# Patient Record
Sex: FEMALE | Race: ASIAN | Employment: FULL TIME | URBAN - METROPOLITAN AREA
[De-identification: names, ages, dates, MRNs, and addresses within clinical notes are randomized per-mention and may not be internally consistent; named-entity substitution may affect disease eponyms.]

---

## 2019-05-06 ENCOUNTER — OFFICE VISIT (OUTPATIENT)
Dept: MIDWIFE SERVICES | Facility: CLINIC | Age: 29
End: 2019-05-06
Payer: COMMERCIAL

## 2019-05-06 VITALS
SYSTOLIC BLOOD PRESSURE: 102 MMHG | HEART RATE: 64 BPM | DIASTOLIC BLOOD PRESSURE: 60 MMHG | WEIGHT: 153 LBS | BODY MASS INDEX: 28.89 KG/M2 | HEIGHT: 61 IN

## 2019-05-06 DIAGNOSIS — N76.0 BACTERIAL VAGINOSIS: ICD-10-CM

## 2019-05-06 DIAGNOSIS — N89.8 VAGINAL ODOR: Primary | ICD-10-CM

## 2019-05-06 DIAGNOSIS — B96.89 BACTERIAL VAGINOSIS: ICD-10-CM

## 2019-05-06 LAB
SPECIMEN SOURCE: ABNORMAL
WET PREP SPEC: ABNORMAL

## 2019-05-06 PROCEDURE — 87210 SMEAR WET MOUNT SALINE/INK: CPT | Performed by: ADVANCED PRACTICE MIDWIFE

## 2019-05-06 PROCEDURE — 99213 OFFICE O/P EST LOW 20 MIN: CPT | Performed by: ADVANCED PRACTICE MIDWIFE

## 2019-05-06 RX ORDER — SACCHAROMYCES BOULARDII 250 MG
250 CAPSULE ORAL 2 TIMES DAILY
Qty: 30 CAPSULE | Refills: 1 | Status: SHIPPED | OUTPATIENT
Start: 2019-05-06 | End: 2019-07-03

## 2019-05-06 RX ORDER — METRONIDAZOLE 500 MG/1
500 TABLET ORAL 2 TIMES DAILY
Qty: 14 TABLET | Refills: 0 | Status: SHIPPED | OUTPATIENT
Start: 2019-05-06 | End: 2019-07-03

## 2019-05-06 RX ORDER — METRONIDAZOLE 500 MG/1
500 TABLET ORAL 2 TIMES DAILY
Qty: 14 TABLET | Refills: 0 | Status: CANCELLED | OUTPATIENT
Start: 2019-05-06 | End: 2019-05-13

## 2019-05-06 RX ORDER — MINOCYCLINE HYDROCHLORIDE 100 MG/1
CAPSULE ORAL
Refills: 0 | COMMUNITY
Start: 2018-08-27 | End: 2019-05-06

## 2019-05-06 RX ORDER — TRETINOIN 0.5 MG/G
CREAM TOPICAL
Refills: 0 | COMMUNITY
Start: 2018-08-28 | End: 2019-05-06

## 2019-05-06 ASSESSMENT — ANXIETY QUESTIONNAIRES
6. BECOMING EASILY ANNOYED OR IRRITABLE: NOT AT ALL
GAD7 TOTAL SCORE: 0
3. WORRYING TOO MUCH ABOUT DIFFERENT THINGS: NOT AT ALL
2. NOT BEING ABLE TO STOP OR CONTROL WORRYING: NOT AT ALL
IF YOU CHECKED OFF ANY PROBLEMS ON THIS QUESTIONNAIRE, HOW DIFFICULT HAVE THESE PROBLEMS MADE IT FOR YOU TO DO YOUR WORK, TAKE CARE OF THINGS AT HOME, OR GET ALONG WITH OTHER PEOPLE: NOT DIFFICULT AT ALL
5. BEING SO RESTLESS THAT IT IS HARD TO SIT STILL: NOT AT ALL
1. FEELING NERVOUS, ANXIOUS, OR ON EDGE: NOT AT ALL
7. FEELING AFRAID AS IF SOMETHING AWFUL MIGHT HAPPEN: NOT AT ALL

## 2019-05-06 ASSESSMENT — MIFFLIN-ST. JEOR: SCORE: 1361.38

## 2019-05-06 ASSESSMENT — PATIENT HEALTH QUESTIONNAIRE - PHQ9
SUM OF ALL RESPONSES TO PHQ QUESTIONS 1-9: 0
5. POOR APPETITE OR OVEREATING: NOT AT ALL

## 2019-05-06 NOTE — PATIENT INSTRUCTIONS
Vaginitis (Vaginal Irritation/Infection)    Vaginitis is very common!  The most common vaginal infections are bacterial vaginosis or yeast. These infections are not sexually transmitted but can be incredibly uncomfortable. Seek care from your midwife if signs or symptoms arise.     Normal vaginal discharge:      Is white, clear, thick or thin (it may change depending on where you are in your cycle)    Does not have a foul odor    The amount of discharge varies    Abnormal discharge/symptoms:       Itching in and around the vagina    Redness, pain or swelling    Discharge that is foamy, greenish, curd like, or bloody    Foul smelling odor    Pain when urinating or having sex    Fever    Causes of vaginal infections:      Good bacteria from the vagina have been destroyed by bad bacteria    Reaction to something in the vagina such as a tampon or scented/perfumed soaps or bubble bath    STI's    Sensitivities to soaps/detergents/dryer sheets, lubricants, etc.    Hormonal changes    Recent use of antibiotics     Infections can also occur after you've had intercourse with a new partner or if you have had frequent intercourse         Here is a list of suggestions that may help prevent/treat vaginal infections and will help maintain a healthy vaginal environment:      1.  Boosting your immune system so you can heal faster      Make sure you are getting adequate sleep    Drink 2-3 quarts of fluids per day, Cranberries or cranberry juice (unsweetened)    Eat more nuts, grains, raw veggies, yogurt, claudia, grapefruit    Decrease intake of refined sugar, red meat and alcohol    Echinacea - 3 times a day for chronic problem or every 2 hours for acute symptoms; use as directed on bottle          2.  Changing the vaginal environment to a more acid state       Soak in a warm bath tub with one cup of vinegar or lemon juice. Do not use scented soap, bubble bath, or oils.       3.  Increasing the good healthy bacteria      At each  meal drink 1 tsp apple cider vinegar and 1 tsp honey in   cup warm water    Eat garlic daily, capsules or fresh.      Take probiotics 4-8 billion units/day      4.  Preventive measures      Wear cotton underwear, no thongs.  Do not wear tight clothes or pantyhose    Shower soon after working or change out of sweaty clothing     Do not wear underwear to bed.  The vaginal environment needs to breathe    Never douche or use vaginal , the vagina is self-cleaning!    Use white, unscented toilet paper.  Do not use baby wipes.  Wipe from front to back    Use only unscented tampons and pads, buy organic products if desired    Do not use perfumes/oils/lotions near your vagina or take bubble baths    Use only mild, unscented soaps around your vaginal area     Do not use fabric softeners/dryer sheets    Use gentle, unscented detergent, consider buying non-petroleum based detergents    Use only water based lubricants during sexual contact    Abstain from intercourse during times of infection      If your symptoms do not resolve or if you have questions please call:     [facility]   [phone]

## 2019-05-06 NOTE — PROGRESS NOTES
"SUBJECTIVE:   Cheryl is a 28 year old here for vaginal symptoms:  Vaginal discharge and foul odor                   Vaginal Symptoms     Onset: A week ago    Description:  Vaginal Discharge: creamy, was light yellow at first  Itching (Pruritis): No  Burning sensation:  No  Odor:  Yes: foul  Irritation:  No    Accompanying Signs & Symptoms:  Pain with Urination: No  Abdominal Pain:  No, just slight cramping  Fever: No   History:   Sexually active:  Yes  New Partner:  No  Possibility of Pregnancy:  Does not believe so  Contraceptive type: none, \"pull out method\".    Precipitating factors:   Recent Antibiotic Use: No    Alleviating factors:  N/A   Therapies Tried and outcome: Tried RePhresh, just made it worse  Previous Episodes of Vaginitis:  No      Other associated symptoms: none.  History of STI's:  No  STI Testing offered: NO (Recommended). Declines because she has had the same partner for 15 years.    Cheryl is due for a pap smear on 19; she declines collecting pap today because she states that her insurance will not cover it.     LMP: Patient's last menstrual period was 2019.      Patient Active Problem List   Diagnosis     Erythrocytosis     Past Medical History:   Diagnosis Date     Asthma     As a child.      Past Surgical History:   Procedure Laterality Date     C INDUCED ABORTN BY DIL/EVAC      elective      Current Outpatient Medications   Medication Sig Dispense Refill     metroNIDAZOLE (FLAGYL) 500 MG tablet Take 1 tablet (500 mg) by mouth 2 times daily for 7 days 14 tablet 0     saccharomyces boulardii (FLORASTOR) 250 MG capsule Take 1 capsule (250 mg) by mouth 2 times daily 30 capsule 1     No Known Allergies    Health maintenance updated:  yes    ROS:   12 point review of systems negative other than symptoms noted below.  Genitourinary: Vaginal Discharge    PHYSICAL EXAM:    /60   Pulse 64   Ht 1.549 m (5' 1\")   Wt 69.4 kg (153 lb)   LMP 2019   BMI 28.91 kg/m  , " Body mass index is 28.91 kg/m .  General appearance:  healthy, alert and no distress  Pelvic Exam:  Vulva: No lesions, no adenopathy, BUS:  wnl. na  Vagina: Moist, pink, discharge white, copious, thin with a few curd-like areas,  well rugated, no lesions  Cervix: smooth, pink, no visible lesions.   Rectal exam: deferred    ASSESSMENT/PLAN:     ICD-10-CM    1. Vaginal odor N89.8 Wet prep   2. Bacterial vaginosis N76.0 saccharomyces boulardii (FLORASTOR) 250 MG capsule    B96.89 metroNIDAZOLE (FLAGYL) 500 MG tablet             COUNSELING:  Advised no alcohol during treat with flagyl. Rx sent to pharmacy.  Use a probiotic when taking antibiotics; Cheryl requested an Rx, sent.   Abstain from sexual intercourse while being treated for vaginal infection  Handout provided about vaginitis and how to prevent future infections.  Return to clinic if symptoms persist or worsen  Advised Cheryl to return to clinic on or after 6/28/19 for annual well-woman exam and pap smear.    Jayna Stone, DNP, APRN, CNM    20 minutes were spent face to face with the patient today discussing her history, diagnosis, and follow-up plan as noted above. Over 50% of the visit was spent in counseling and coordination of care.     Total Visit Time: 20 minutes.

## 2019-05-07 ASSESSMENT — ANXIETY QUESTIONNAIRES: GAD7 TOTAL SCORE: 0

## 2019-07-03 ENCOUNTER — OFFICE VISIT (OUTPATIENT)
Dept: FAMILY MEDICINE | Facility: CLINIC | Age: 29
End: 2019-07-03
Payer: COMMERCIAL

## 2019-07-03 VITALS
HEART RATE: 84 BPM | OXYGEN SATURATION: 98 % | HEIGHT: 61 IN | DIASTOLIC BLOOD PRESSURE: 68 MMHG | RESPIRATION RATE: 12 BRPM | WEIGHT: 154 LBS | TEMPERATURE: 99.2 F | SYSTOLIC BLOOD PRESSURE: 108 MMHG | BODY MASS INDEX: 29.07 KG/M2

## 2019-07-03 DIAGNOSIS — Z00.00 ROUTINE GENERAL MEDICAL EXAMINATION AT A HEALTH CARE FACILITY: Primary | ICD-10-CM

## 2019-07-03 DIAGNOSIS — Z30.011 ENCOUNTER FOR BCP (BIRTH CONTROL PILLS) INITIAL PRESCRIPTION: ICD-10-CM

## 2019-07-03 DIAGNOSIS — Z12.4 SCREENING FOR CERVICAL CANCER: ICD-10-CM

## 2019-07-03 PROCEDURE — G0145 SCR C/V CYTO,THINLAYER,RESCR: HCPCS | Performed by: INTERNAL MEDICINE

## 2019-07-03 PROCEDURE — 99385 PREV VISIT NEW AGE 18-39: CPT | Performed by: INTERNAL MEDICINE

## 2019-07-03 RX ORDER — METFORMIN HCL 500 MG
TABLET, EXTENDED RELEASE 24 HR ORAL
COMMUNITY
Start: 2019-06-25 | End: 2020-05-05

## 2019-07-03 RX ORDER — DROSPIRENONE AND ETHINYL ESTRADIOL 0.02-3(28)
1 KIT ORAL DAILY
Qty: 84 TABLET | Refills: 4 | Status: SHIPPED | OUTPATIENT
Start: 2019-07-03 | End: 2020-05-05

## 2019-07-03 ASSESSMENT — MIFFLIN-ST. JEOR: SCORE: 1360.92

## 2019-07-03 NOTE — PROGRESS NOTES
SUBJECTIVE:   CC: Cheryl Fletcher is an 29 year old woman who presents for preventive health visit.     Alek lives with her 3 kids (ages 3, 4, and 5), , and father. She works as an .     Healthy Habits:     Getting at least 3 servings of Calcium per day:  Yes    Bi-annual eye exam:  Yes    Dental care twice a year:  Yes    Sleep apnea or symptoms of sleep apnea:  None    Diet:  Regular (no restrictions)    Frequency of exercise:  4-5 days/week    Duration of exercise:  15-30 minutes    Taking medications regularly:  Yes    Medication side effects:  Not applicable    PHQ-2 Total Score: 0    Additional concerns today:  No      Sees a provider at her work clinic for metformin which she takes for weight loss.       Today's PHQ-2 Score:   PHQ-2 (  [employer]) 7/3/2019   Q1: Little interest or pleasure in doing things 0   Q2: Feeling down, depressed or hopeless 0   PHQ-2 Score 0   Q1: Little interest or pleasure in doing things Not at all   Q2: Feeling down, depressed or hopeless Not at all   PHQ-2 Score 0           Social History     Tobacco Use     Smoking status: Never Smoker     Smokeless tobacco: Never Used   Substance Use Topics     Alcohol use: No     Alcohol/week: 0.0 oz         Alcohol Use 7/3/2019   Prescreen: >3 drinks/day or >7 drinks/week? No       Reviewed orders with patient.  Reviewed health maintenance and updated orders accordingly - Yes  Patient Active Problem List   Diagnosis     Erythrocytosis     Past Surgical History:   Procedure Laterality Date     C INDUCED ABORTN BY DIL/EVAC      elective        Social History     Tobacco Use     Smoking status: Never Smoker     Smokeless tobacco: Never Used   Substance Use Topics     Alcohol use: No     Alcohol/week: 0.0 oz     Family History   Problem Relation Age of Onset     Rectal Cancer Mother      Hypertension Sister      Family History Negative No family hx of              Mammogram not appropriate for this patient based on  "age.    Pertinent mammograms are reviewed under the imaging tab.  History of abnormal Pap smear: NO - age 21-29 PAP every 3 years recommended  PAP / HPV 6/28/2016 6/6/2013   PAP NIL NIL     Reviewed and updated as needed this visit by clinical staff  Tobacco  Allergies  Meds  Med Hx  Surg Hx  Fam Hx  Soc Hx        Reviewed and updated as needed this visit by Provider  Tobacco  Meds  Med Hx  Surg Hx  Fam Hx  Soc Hx           Review of Systems  CONSTITUTIONAL: NEGATIVE for fever, chills, change in weight  INTEGUMENTARU/SKIN: NEGATIVE for worrisome rashes, moles or lesions  EYES: NEGATIVE for vision changes or irritation  ENT: NEGATIVE for ear, mouth and throat problems  RESP: NEGATIVE for significant cough or SOB  BREAST: NEGATIVE for masses, tenderness or discharge  CV: NEGATIVE for chest pain, palpitations or peripheral edema  GI: NEGATIVE for nausea, abdominal pain, heartburn, or change in bowel habits  : NEGATIVE for unusual urinary or vaginal symptoms. Periods are regular about every 45 days.  MUSCULOSKELETAL: NEGATIVE for significant arthralgias or myalgia  NEURO: NEGATIVE for weakness, dizziness or paresthesias  PSYCHIATRIC: NEGATIVE for changes in mood or affect     OBJECTIVE:   /68 (Cuff Size: Adult Regular)   Pulse 84   Temp 99.2  F (37.3  C) (Tympanic)   Resp 12   Ht 1.549 m (5' 1\")   Wt 69.9 kg (154 lb)   LMP 06/05/2019 (Approximate)   SpO2 98%   BMI 29.10 kg/m    Physical Exam  GENERAL: healthy, alert and no distress  EYES: Eyes grossly normal to inspection, PERRL and conjunctivae and sclerae normal  HENT: ear canals and TM's normal, mouth without ulcers or lesions  NECK: no adenopathy, no asymmetry, masses, or scars and thyroid normal to palpation  RESP: lungs clear to auscultation - no rales, rhonchi or wheezes  BREAST: normal without masses, tenderness or nipple discharge and no palpable axillary masses or adenopathy  CV: regular rate and rhythm, normal S1 S2, no S3 or S4, no " "murmur, click or rub, no peripheral edema and peripheral pulses strong  ABDOMEN: soft, nontender, no hepatosplenomegaly, no masses and bowel sounds normal   (female): normal female external genitalia, normal urethral meatus, vaginal mucosa pink, moist, well rugated, and normal cervix  MS: no gross musculoskeletal defects noted, no edema  SKIN: no suspicious lesions or rashes  NEURO: Normal strength and tone, mentation intact and speech normal  PSYCH: mentation appears normal, affect normal/bright        ASSESSMENT/PLAN:   1. Routine general medical examination at a health care facility  Healthy 29 year old. Gets labs done at clinic at work.  Imms UTD    2. Encounter for BCP (birth control pills) initial prescription  Discussed options for birth control, she would like to go with the pill. Reviewed common side effects. She has no personal of FH of VTE.    - drospirenone-ethinyl estradiol (CHRISTINE) 3-0.02 MG tablet; Take 1 tablet by mouth daily  Dispense: 84 tablet; Refill: 4    3. Screening for cervical cancer  - PAP imaged thin layer screen reflex to HPV if ASCUS - recommended age 25 - 29 years    COUNSELING:  Reviewed preventive health counseling, as reflected in patient instructions  Special attention given to:        Regular exercise       Healthy diet/nutrition       Contraception       Osteoporosis Prevention/Bone Health    Estimated body mass index is 29.1 kg/m  as calculated from the following:    Height as of this encounter: 1.549 m (5' 1\").    Weight as of this encounter: 69.9 kg (154 lb).         reports that she has never smoked. She has never used smokeless tobacco.      Counseling Resources:  ATP IV Guidelines  Pooled Cohorts Equation Calculator  Breast Cancer Risk Calculator  FRAX Risk Assessment  ICSI Preventive Guidelines  Dietary Guidelines for Americans, 2010  [employer] MyPlate  ASA Prophylaxis  Lung CA Screening    Nallely Jones MD  [facility]  "

## 2019-07-03 NOTE — PROGRESS NOTES
Answers for HPI/ROS submitted by the patient on 7/3/2019   Annual Exam:  Frequency of exercise:: 4-5 days/week  Getting at least 3 servings of Calcium per day:: Yes  Diet:: Regular (no restrictions)  Taking medications regularly:: Yes  Medication side effects:: Not applicable  Bi-annual eye exam:: Yes  Dental care twice a year:: Yes  Sleep apnea or symptoms of sleep apnea:: None  Additional concerns today:: No  Duration of exercise:: 15-30 minutes

## 2019-07-08 LAB
COPATH REPORT: NORMAL
PAP: NORMAL

## 2020-03-02 ENCOUNTER — HEALTH MAINTENANCE LETTER (OUTPATIENT)
Age: 30
End: 2020-03-02

## 2020-05-05 ENCOUNTER — PRENATAL OFFICE VISIT (OUTPATIENT)
Dept: NURSING | Facility: CLINIC | Age: 30
End: 2020-05-05
Payer: COMMERCIAL

## 2020-05-05 VITALS — BODY MASS INDEX: 29.1 KG/M2 | HEIGHT: 61 IN

## 2020-05-05 DIAGNOSIS — Z34.82 ENCOUNTER FOR SUPERVISION OF OTHER NORMAL PREGNANCY IN SECOND TRIMESTER: Primary | ICD-10-CM

## 2020-05-05 PROCEDURE — 99207 ZZC NO CHARGE NURSE ONLY: CPT

## 2020-05-05 RX ORDER — PRENATAL VIT/IRON FUM/FOLIC AC 27MG-0.8MG
1 TABLET ORAL DAILY
Qty: 90 TABLET | Refills: 3
Start: 2020-05-05

## 2020-05-05 SDOH — ECONOMIC STABILITY: TRANSPORTATION INSECURITY
IN THE PAST 12 MONTHS, HAS LACK OF TRANSPORTATION KEPT YOU FROM MEETINGS, WORK, OR FROM GETTING THINGS NEEDED FOR DAILY LIVING?: NO

## 2020-05-05 SDOH — ECONOMIC STABILITY: TRANSPORTATION INSECURITY
IN THE PAST 12 MONTHS, HAS THE LACK OF TRANSPORTATION KEPT YOU FROM MEDICAL APPOINTMENTS OR FROM GETTING MEDICATIONS?: NO

## 2020-05-05 SDOH — ECONOMIC STABILITY: FOOD INSECURITY: WITHIN THE PAST 12 MONTHS, THE FOOD YOU BOUGHT JUST DIDN'T LAST AND YOU DIDN'T HAVE MONEY TO GET MORE.: NEVER TRUE

## 2020-05-05 SDOH — ECONOMIC STABILITY: FOOD INSECURITY: WITHIN THE PAST 12 MONTHS, YOU WORRIED THAT YOUR FOOD WOULD RUN OUT BEFORE YOU GOT MONEY TO BUY MORE.: NEVER TRUE

## 2020-05-05 SDOH — ECONOMIC STABILITY: INCOME INSECURITY: HOW HARD IS IT FOR YOU TO PAY FOR THE VERY BASICS LIKE FOOD, HOUSING, MEDICAL CARE, AND HEATING?: NOT VERY HARD

## 2020-05-05 NOTE — PROGRESS NOTES
"Chief Complaint   Patient presents with     Prenatal Care     New Prenatal Nurse Telephone Visit       21w0d      Initial Ht 1.549 m (5' 1\")   LMP 2019   BMI 29.10 kg/m   Estimated body mass index is 29.1 kg/m  as calculated from the following:    Height as of this encounter: 1.549 m (5' 1\").    Weight as of 7/3/19: 69.9 kg (154 lb).  BP completed using cuff size: NA (Not Taken)    Questioned patient about current smoking habits.  Pt. has never smoked.      Prenatal book and folder (containing standard educational hand-outs and brochures) to be given to patient at appt 20 Information in folder reviewed. Questions answered. Brochure given on optional screening available to assess chromosomal anomalies. Pt advised to call the clinic if she has any questions or concerns related to her preg/geraldine./ Prenatal labs to be obtained 20.New prenatal visit scheduled on 20 with Ame Henry CNM.        Lab Results   Component Value Date    PAP NIL 2019           Patient supplied answers from flow sheet for:  Prenatal OB Questionnaire.  Past Medical History  Diabetes?: No  Hypertension : No  Heart disease, mitral valve prolapse or rheumatic fever?: No  An autoimmune disease such as lupus or rheumatoid arthritis?: No  Kidney disease or urinary tract infection?: No  Epilepsy, seizures or spells?: No  Migraine headaches?: No  A stroke or loss of function or sensation?: No  Any other neurological problems?: No  Have you ever been treated for depression?: No  Are you having problems with crying spells or loss of self-esteem?: No  Have you ever required psychiatric care?: No  Have you ever had hepatitis, liver disease or jaundice?: No  Have you been treated for blood clots in your veins, deep vein thromosis, inflammation in the veins, thrombosis, phlebitis, pulmonary embolism or varicosities?: No  Have you had excessive bleeding after surgery or dental work?: No  Do you bleed more than other women " after a cut or scratch?: No  Do you have a history of anemia?: (!) Yes  Have you ever had thyroid problems or taken thyroid medication?: No   Do you have any endocrine problems?: No  Have you ever been in a major accident or suffered serious trauma?: No  Within the last year, has anyone hit, slapped, kicked or otherwise hurt you?: No  In the last year, has anyone forced you to have sex when you didn't want to?: No    Past Medical History 2   Have you ever received a blood transfusion?: No  Would you refuse a blood transfusion if a doctor judged it to be medically necessary?: No   If you answered Yes, would you rather die than receive a blood transfusion?: No  If you answered Yes, is this for Orthodoxy reasons?: No  Does anyone in your home smoke?: (!) Yes(FOB smokes outside the home)  Do you use tobacco products?: No  Do you drink beer, wine or hard liquor?: No  Do you use any of the following: marijuana, speed, cocaine, heroin, hallucinogens or other drugs?: No   Is your blood type Rh negative?: No  Have you ever had abnormal antibodies in your blood?: No  Have you ever had asthma?: (!) Yes(as a child)  Have you ever had tuberculosis?: No  Do you have any allergies to drugs or over-the-counter medications?: No  Allergies: Dust Mites, Aspartame, Ethanol, Venlafaxine, Hydrochloride, Sertraline: No  Have you had any breast problems?: No  Have you ever ?: (!) Yes  Have you had any gynecological surgical procedures such as cervical conization, a LEEP procedure, laser treatment, cryosurgery of the cervix or a dilation and curettage, etc?: (!) Yes(TAB)  Have you ever had any other surgical procedures?: (!) Yes(see surgical history)  Have you been hospitalized for a nonsurgical reason excluding normal delivery?: No  Have you ever had any anesthetic complications?: No  Have you ever had an abnormal pap smear?: No    Past Medical History (Continued)  Do you have a history of abnormalities of the uterus?: No  Did  your mother take KEILA or any other hormones when she was pregnant with you?: No  Did it take you more than a year to become pregnant?: No  Have you ever been evaluated or treated for infertility?: No  Is there a history of medical problems in your family, which you feel may be important to this pregnancy?: No  Do you have any other problems we have not asked about which you feel may be important to this pregnancy?: No    Symptoms since last menstrual period  Do you have any of the following symptoms: abdominal pain, blood in stools or urine, chest pain, shortness of breath, coughing or vomiting up blood, your heart racing or skipping beats, nausea and vomiting, pain on urination or vaginal discharge or bleed: No  Current medications, including over-the-counter medications, you are using? (If not applicable answer none): PNV  Will the patient be 35 years old or older at the time of delivery?: No    Has the patient, baby's father or anyone in either family had:  Thalassemia (Italian, Greek, Mediterranean or  background only) and an MCV result less than 80?: No  Neural tube defect such as meningomyelocele, spina bifida or anencephaly?: No  Congenital heart defect?: No  Down's Syndrome?: No  Vernon-Sachs disease (Buddhism, Cajun, Japanese-Ada)?: No  Sickle cell disease or trait ()?: No  Hemophilia or other inherited problems of blood?: No  Muscular dystrophy?: No  Cystic fibrosis?: No  [place]'s chorea?: No  Mental retardation/autism?: No  If yes, was the person tested for fragile X?: No  Any other inherited genetic or chromosomal disorder?: No  Maternal metabolic disorder (e.g Insulin-dependent diabetes, PKU)?: No  A child with birth defects not listed above?: No  Recurrent pregnancy loss or stillbirth?: No   Has the patient had any medications/street drugs/alcohol since her last menstrual period?: No  Does the patient or baby's father have any other genetic risks?: No    Infection History   Do you object  to being tested for Hepatitis B?: No  Do you object to being tested for HIV?: No   Do you feel that you are at high risk for coming in contact with the AIDS virus?: No  Have you ever been treated for tuberculosis?: No  Have you ever had a positive skin test for tuberculosis?: No  Do you live with someone who has tuberculosis?: No  Have you ever been exposed to tuberculosis?: No  Do you have genital herpes?: No  Does your partner have genital herpes?: No  Have you had a viral illness since your last period?: No  Have you ever had gonorrhea, chlamydia, syphilis, venereal warts, trichomoniasis, pelvic inflammatory disease or any other sexually transmitted disease?: No  Do you know if you are a genital group B streptococcus carrier?: (!) Yes  Have you had chicken pox/varicella?: No   Have you been vaccinated against chicken Pox?: (!) Yes  Have you had any other infectious diseases?: No    Raquel Vera RN

## 2020-05-08 ENCOUNTER — PRENATAL OFFICE VISIT (OUTPATIENT)
Dept: OBGYN | Facility: CLINIC | Age: 30
End: 2020-05-08
Payer: COMMERCIAL

## 2020-05-08 VITALS — SYSTOLIC BLOOD PRESSURE: 118 MMHG | WEIGHT: 161.9 LBS | DIASTOLIC BLOOD PRESSURE: 62 MMHG | BODY MASS INDEX: 30.59 KG/M2

## 2020-05-08 DIAGNOSIS — Z34.82 ENCOUNTER FOR SUPERVISION OF OTHER NORMAL PREGNANCY IN SECOND TRIMESTER: ICD-10-CM

## 2020-05-08 DIAGNOSIS — Z34.82 ENCOUNTER FOR SUPERVISION OF OTHER NORMAL PREGNANCY, SECOND TRIMESTER: Primary | ICD-10-CM

## 2020-05-08 LAB
ABO + RH BLD: NORMAL
ABO + RH BLD: NORMAL
BLD GP AB SCN SERPL QL: NORMAL
BLOOD BANK CMNT PATIENT-IMP: NORMAL
ERYTHROCYTE [DISTWIDTH] IN BLOOD BY AUTOMATED COUNT: 13.5 % (ref 10–15)
HCT VFR BLD AUTO: 31.6 % (ref 35–47)
HGB BLD-MCNC: 10.3 G/DL (ref 11.7–15.7)
MCH RBC QN AUTO: 25.1 PG (ref 26.5–33)
MCHC RBC AUTO-ENTMCNC: 32.6 G/DL (ref 31.5–36.5)
MCV RBC AUTO: 77 FL (ref 78–100)
PLATELET # BLD AUTO: 230 10E9/L (ref 150–450)
RBC # BLD AUTO: 4.1 10E12/L (ref 3.8–5.2)
SPECIMEN EXP DATE BLD: NORMAL
WBC # BLD AUTO: 9.3 10E9/L (ref 4–11)

## 2020-05-08 PROCEDURE — 86850 RBC ANTIBODY SCREEN: CPT | Performed by: ADVANCED PRACTICE MIDWIFE

## 2020-05-08 PROCEDURE — 87389 HIV-1 AG W/HIV-1&-2 AB AG IA: CPT | Performed by: ADVANCED PRACTICE MIDWIFE

## 2020-05-08 PROCEDURE — 86762 RUBELLA ANTIBODY: CPT | Performed by: ADVANCED PRACTICE MIDWIFE

## 2020-05-08 PROCEDURE — 86780 TREPONEMA PALLIDUM: CPT | Performed by: ADVANCED PRACTICE MIDWIFE

## 2020-05-08 PROCEDURE — 87491 CHLMYD TRACH DNA AMP PROBE: CPT | Performed by: ADVANCED PRACTICE MIDWIFE

## 2020-05-08 PROCEDURE — 86900 BLOOD TYPING SEROLOGIC ABO: CPT | Performed by: ADVANCED PRACTICE MIDWIFE

## 2020-05-08 PROCEDURE — 36415 COLL VENOUS BLD VENIPUNCTURE: CPT | Performed by: ADVANCED PRACTICE MIDWIFE

## 2020-05-08 PROCEDURE — 87340 HEPATITIS B SURFACE AG IA: CPT | Performed by: ADVANCED PRACTICE MIDWIFE

## 2020-05-08 PROCEDURE — 87086 URINE CULTURE/COLONY COUNT: CPT | Performed by: ADVANCED PRACTICE MIDWIFE

## 2020-05-08 PROCEDURE — 87591 N.GONORRHOEAE DNA AMP PROB: CPT | Performed by: ADVANCED PRACTICE MIDWIFE

## 2020-05-08 PROCEDURE — 99207 ZZC FIRST OB VISIT: CPT | Performed by: ADVANCED PRACTICE MIDWIFE

## 2020-05-08 PROCEDURE — 85027 COMPLETE CBC AUTOMATED: CPT | Performed by: ADVANCED PRACTICE MIDWIFE

## 2020-05-08 PROCEDURE — 86901 BLOOD TYPING SEROLOGIC RH(D): CPT | Performed by: ADVANCED PRACTICE MIDWIFE

## 2020-05-08 NOTE — NURSING NOTE
"Chief Complaint   Patient presents with     Prenatal Care     NPN 21w 3d       Initial /62 (BP Location: Right arm, Cuff Size: Adult Regular)   Wt 73.4 kg (161 lb 14.4 oz)   LMP 2019   BMI 30.59 kg/m   Estimated body mass index is 30.59 kg/m  as calculated from the following:    Height as of 20: 1.549 m (5' 1\").    Weight as of this encounter: 73.4 kg (161 lb 14.4 oz).  BP completed using cuff size: regular    Questioned patient about current smoking habits.  Pt. has never smoked.          The following HM Due: KERLINE Bell CMA      "

## 2020-05-08 NOTE — PATIENT INSTRUCTIONS
Thank you for coming to see the Midwives at the   [facility]      We will notify you about your labs that were drawn today once we get the results back or if you have [employer] they will be posted there as well      We will call you personally with results that require further discussion      If any referrals were ordered today you should be getting a call in the next week or you may need to call the number listed with your referral to schedule.            If you need any refills of medications please call your pharmacy and they will contact us      If you have any questions or concerns before your next visit, you can reach the nurse midwife on call by calling our pager number [phone].      If you  wish to schedule another appointment, please call our office at [phone]. You can also make appointments through [employer]        If you have a medical emergency please call [phone].    Because you are pregnant, we have additional resources for you:      You may call our consulting RN's during normal business hours for non-urgent questions about your pregnancy.      After hours you may also page the midwife on call for urgent questions or issues at [phone].  There is always a midwife on call 24 hours a day.    Prenatal Reminders:    Before 14 weeks: Dating ultrasound, Genetic testing       This ultrasound helps us determine your dates accurately. Verifi can be drawn anytime after 10 weeks of gestation  16 weeks: Optional genetic testing (quad screen) or single AFP       This testing helps understand your baby's risk for some genetic abnormalities.  20 weeks:  Screening ultrasound (fetal survey)       This testing will look for early growth abnormalities, and may tell the baby's sex if you wish to find out.  28 weeks: One hour sugar test (GCT), hemoglobin and platelets       This test helps identify diabetes of pregnancy or gestational diabetes.  We also look      at the iron in your blood and how well your  blood clots.  28 - 36 weeks: Tetanus shot (Tdap)       This shot helps protect you and your baby from tetanus and whooping cough.  36 weeks and later: Group B Strep test (GBS)       This test helps predict if you need antibiotics in labor to prevent infection in your baby.  Anytime September to April:  Flu shot       This shot helps protect you and your family from the flu.  This is especially important during pregnancy        Any time during or after your pregnancy you may experience increased depression and/or mood changes.    We are here to support you. Please contact us if you are:    Feeling anxious    Overwhelmed or sad     Trouble sleeping    Crying uncontrollably    Trouble caring for yourself or baby.    The typical schedule after your first visit today you can expect:     Visit 2 - 12-16 weeks  Visit 3 - 20 weeks  Visit 4 - 24 weeks  Visit 5 - 28 weeks  Visit 6 - 32 weeks  Visit 7 - 34 weeks  Visit 8 - 36 weeks  Weekly after 36 weeks until delivery.    If anything comes up between your visits or you have concerns please don't hesitate to contact us.    Secure access to your medical record:  Use [employer] (secure email communication and access to your chart) to send your primary care provider a message or make an appointment. Ask someone on your Team how to sign up for [employer]. To log on to [employer] or for more information in [employer] please visit the website at [email or web address].       Certified Nurse Midwife (CNM) Team  VINCENT Tolliver, VINCENT Harris, VINCENT Cole, VINCENT Moseley, SUKUMAR    Again, thank you for choosing the midwives at [facility].  We are excited to be a part of your pregnancy. Please let us know how we can best partner with you to improve your and your family's health.

## 2020-05-08 NOTE — PROGRESS NOTES
Cheryl Fletcher is a 29 year old single [place]ian,  who is not a previous CNM patient. She presents for a new OB Visit. This was not a planned pregnancy.   Was previously on OCPs but hadn't refilled them for several months.  FOB is Mio who is in good health.  DWAYNE IS actively involved in relationship and this pregnancy.    She first realized she was pregnant back in February. Scheduled NOB with another clinic but was told she would have to reschedule at another location, and then quarantine advisories were initiated due to Covid-19, so she was not able to establish care until now.    She has not had bleeding since her LMP.    She denies abdominal pain since her LMP.  She had very mild nausea without vomiting in the first trimester. Nausea triggered only by brushing teeth.    Any personal or family history of blood clots? No  History of sickle cell anemia or trait? No         Patient's last menstrual period was 2019. EDC 19.  Estimated Date of Delivery: Sep 15, 2020 Ultrasound consistent with LMP.    MENSTRUAL HISTORY    frequency: every 40-45 days off OCPs  Last PAP:    History of abnormal Pap?  No    Health maintenance updated:  yes        Current medications are:    Current Outpatient Medications:      Prenatal Vit-Fe Fumarate-FA (PRENATAL MULTIVITAMIN W/IRON) 27-0.8 MG tablet, Take 1 tablet by mouth daily, Disp: 90 tablet, Rfl: 3     INFECTION HISTORY  HIV: No  Hepatitis B: No  Hepatitis C: No  Tuberculosis: No    Genital Herpes self: no  Herpes partner:  no  Chlamydia:  no  Gonorrhea:  no  HPV: No  BV:  No  Syphilis:  No  Chicken Pox:  No - vaccinated      OB HISTORY  OB History    Para Term  AB Living   5 3 3 0 1 3   SAB TAB Ectopic Multiple Live Births   0 1 0 0 3      # Outcome Date GA Lbr Jose M/2nd Weight Sex Delivery Anes PTL Lv   5 Current            4 Term 16 39w4d 08:59 / 00:05 3.43 kg (7 lb 9 oz) F Vag-Spont Local N NERI      Apgar1: 9  Apgar5: 9   3 Term 02/12/15 40w2d  03:14 / 00:05 3.997 kg (8 lb 13 oz) F Vag-Spont None N NERI      Apgar1: 7  Apgar5: 9   2 Term 13 39w5d 09:34 / 01:37 3.3 kg (7 lb 4.4 oz) M Vag-Spont EPI  NERI      Name: Binu      Apgar1: 9  Apgar5: 9   1 TAB  17w0d             Birth Comments:        History of GDM: No,  PTL : No,  History of HTN in pregnancy: No,  Thrombocytopenia: No,  Shoulder dystocia: Yes - with second baby in   Vacuum Extraction: No  PPH: No   3rd of 4th degree laceration: No.   Other complications: No    PERSONAL HISTORY  Exercise Habits:  none  Employment: Full time.  Works as an  for [employer]. Her job involves sedentary activity with no potential for toxic exposure.    Travel plans:  are none planned.   Diet: eats regular meals and follows a balanced nutrition diet  Abuse concerns? No  Hgb A1c screen:  BMI > 30: No, 1st degree family DM: No, History of GDM: No, PCOS: No, High risk ethnicity: No    Social History     Socioeconomic History     Marital status: Single     Spouse name: Not on file     Number of children: 3     Years of education: Not on file     Highest education level: Associate degree: academic program   Occupational History     Occupation:    Social Needs     Financial resource strain: Not very hard     Food insecurity     Worry: Never true     Inability: Never true     Transportation needs     Medical: No     Non-medical: No   Tobacco Use     Smoking status: Never Smoker     Smokeless tobacco: Never Used   Substance and Sexual Activity     Alcohol use: No     Alcohol/week: 0.0 standard drinks     Drug use: No     Sexual activity: Yes     Partners: Male   Lifestyle     Physical activity     Days per week: Not on file     Minutes per session: Not on file     Stress: Not on file   Relationships     Social connections     Talks on phone: Not on file     Gets together: Not on file     Attends Church service: Not on file     Active member of club or organization: Not on  file     Attends meetings of clubs or organizations: Not on file     Relationship status: Not on file     Intimate partner violence     Fear of current or ex partner: Not on file     Emotionally abused: Not on file     Physically abused: Not on file     Forced sexual activity: Not on file   Other Topics Concern     Not on file   Social History Narrative     Not on file         She  reports that she has never smoked. She has never used smokeless tobacco.  STD testing offered?  Accepted  Last PHQ-9 score on record =   PHQ-9 SCORE 2019   PHQ-9 Total Score -   PHQ-9 Total Score 0     Last GAD7 score on record =   YASH-7 SCORE 2019   Total Score 0     Alcohol Score = 0  Referral/Meds needed? no    PAST MEDICAL/SURGICAL HISTORY  Past Medical History:   Diagnosis Date     Asthma     As a child.      Past Surgical History:   Procedure Laterality Date     C INDUCED ABORTN BY DIL/EVAC      elective      LASIK BILATERAL         FAMILY HISTORY  Family History   Problem Relation Age of Onset     Rectal Cancer Mother      Hypertension Sister      Asthma Father      Family History Negative No family hx of          ROS:  12 point review of systems negative other than symptoms noted below or in the HPI.    PHYSICAL EXAM  Vitals: /62 (BP Location: Right arm, Cuff Size: Adult Regular)   Wt 73.4 kg (161 lb 14.4 oz)   LMP 2019   BMI 30.59 kg/m    BMI= Body mass index is 30.59 kg/m .     GENERAL:  29 year old pleasant pregnant female, alert, cooperative and well groomed.  NECK:  Thyroid without enlargement and nodules.  Lymph nodes not palpable.   LUNGS:  Normal diaphragmatic excursion. Clear to auscultation bilaterally.  HEART:  RRR without murmur.  ABDOMEN: Soft without masses or tenderness. Fundus measures appropriately with dates and FHTs audible and WNL.  PELVIC: Deferred  LOWER EXTREMITIES: No edema. Normal reflexes.    ASSESSMENT/PLAN:    IUP at 21w3d    ICD-10-CM    1. Encounter for supervision of  other normal pregnancy, second trimester  Z34.82 NEISSERIA GONORRHOEA PCR     CHLAMYDIA TRACHOMATIS PCR   2. Encounter for supervision of other normal pregnancy in second trimester  Z34.82 Neisseria gonorrhoeae PCR     Chlamydia trachomatis PCR     Urine Culture Aerobic Bacterial     Treponema Abs w Reflex to RPR and Titer     Rubella Antibody IgG Quantitative     HIV Antigen Antibody Combo     CBC with platelets     Hepatitis B surface antigen     ABO/Rh type and screen        consult for US for AMA patients: No  Genetic Testing reviewed and discussed, patient declines testing.    Informed of bilateral CPCs on ultrasound this afternoon. Procedure report not finalized yet; anticipate recommendations for follow-up ultrasound in the coming weeks.    COUNSELING    Reviewed CNM philosophy, call schedule for labor and delivery, and FSH for delivery    1st OB handout given outlining appointment spacing and CNM information. Discussed modified face-to-face prenatal visits due to Covid-19    Instructed on use of triage nurse line and contacting the on call CNM after hours in an emergency.     Symptoms of N&V and fatigue usually start to resolve around 12-16 weeks    Reviewed exercise and nutrition    Recommend to gain 15-25 pounds with her pregnancy (pre-pregnancy BMI<30).    Discussed OTC medications. OB med list given    Encouraged patient to arrange  if needed    Encouraged patient to take PNV's/DHA    Travel precautions discussed, no air travel after 36 weeks and Zika Virus discussed    Will call patient with lab results when available    Does patient desire a RN home visit from the [facility]?  No     F/U to be addressed next visit:  Follow-up ultrasound    Will plan virtual visit in 4 weeks for her next routine prenatal check.  Will call to be seen sooner if problems arise.    Kerrie Henry, DNP, APRN, CNM

## 2020-05-09 LAB — T PALLIDUM AB SER QL: NONREACTIVE

## 2020-05-10 LAB
BACTERIA SPEC CULT: NO GROWTH
C TRACH DNA SPEC QL NAA+PROBE: NEGATIVE
N GONORRHOEA DNA SPEC QL NAA+PROBE: NEGATIVE
SPECIMEN SOURCE: NORMAL

## 2020-05-11 DIAGNOSIS — O99.012 ANEMIA AFFECTING PREGNANCY IN SECOND TRIMESTER: Primary | ICD-10-CM

## 2020-05-11 DIAGNOSIS — O28.3 ABNORMAL FETAL ULTRASOUND: ICD-10-CM

## 2020-05-11 LAB
HBV SURFACE AG SERPL QL IA: NONREACTIVE
HIV 1+2 AB+HIV1 P24 AG SERPL QL IA: NONREACTIVE
RUBV IGG SERPL IA-ACNC: 9 IU/ML

## 2020-05-11 RX ORDER — FERROUS GLUCONATE 324(38)MG
324 TABLET ORAL
Qty: 60 TABLET | Refills: 1 | Status: SHIPPED | OUTPATIENT
Start: 2020-05-11 | End: 2020-09-10

## 2020-05-12 ENCOUNTER — TRANSCRIBE ORDERS (OUTPATIENT)
Dept: MATERNAL FETAL MEDICINE | Facility: CLINIC | Age: 30
End: 2020-05-12

## 2020-05-12 DIAGNOSIS — O26.90 PREGNANCY RELATED CONDITION, ANTEPARTUM: Primary | ICD-10-CM

## 2020-05-14 ENCOUNTER — PRE VISIT (OUTPATIENT)
Dept: MATERNAL FETAL MEDICINE | Facility: CLINIC | Age: 30
End: 2020-05-14

## 2020-05-18 ENCOUNTER — OFFICE VISIT (OUTPATIENT)
Dept: MATERNAL FETAL MEDICINE | Facility: CLINIC | Age: 30
End: 2020-05-18
Attending: ADVANCED PRACTICE MIDWIFE
Payer: COMMERCIAL

## 2020-05-18 ENCOUNTER — HOSPITAL ENCOUNTER (OUTPATIENT)
Dept: ULTRASOUND IMAGING | Facility: CLINIC | Age: 30
End: 2020-05-18
Attending: ADVANCED PRACTICE MIDWIFE
Payer: COMMERCIAL

## 2020-05-18 DIAGNOSIS — O35.9XX0 FETAL ABNORMALITY AFFECTING MANAGEMENT OF MOTHER, ANTEPARTUM, SINGLE OR UNSPECIFIED FETUS: Primary | ICD-10-CM

## 2020-05-18 DIAGNOSIS — O26.90 PREGNANCY RELATED CONDITION, ANTEPARTUM: ICD-10-CM

## 2020-05-18 PROCEDURE — 76811 OB US DETAILED SNGL FETUS: CPT

## 2020-05-18 NOTE — PROGRESS NOTES
Please see full imaging report from [facility] program under imaging tab.      Nel Collins MD  Maternal Fetal Medicine

## 2020-06-24 ENCOUNTER — PRENATAL OFFICE VISIT (OUTPATIENT)
Dept: OBGYN | Facility: CLINIC | Age: 30
End: 2020-06-24
Payer: COMMERCIAL

## 2020-06-24 VITALS — BODY MASS INDEX: 31.5 KG/M2 | DIASTOLIC BLOOD PRESSURE: 68 MMHG | WEIGHT: 166.7 LBS | SYSTOLIC BLOOD PRESSURE: 122 MMHG

## 2020-06-24 DIAGNOSIS — Z34.83 ENCOUNTER FOR SUPERVISION OF OTHER NORMAL PREGNANCY IN THIRD TRIMESTER: Primary | ICD-10-CM

## 2020-06-24 LAB
ERYTHROCYTE [DISTWIDTH] IN BLOOD BY AUTOMATED COUNT: 15.6 % (ref 10–15)
HCT VFR BLD AUTO: 32.6 % (ref 35–47)
HGB BLD-MCNC: 10.5 G/DL (ref 11.7–15.7)
MCH RBC QN AUTO: 25.4 PG (ref 26.5–33)
MCHC RBC AUTO-ENTMCNC: 32.2 G/DL (ref 31.5–36.5)
MCV RBC AUTO: 79 FL (ref 78–100)
PLATELET # BLD AUTO: 221 10E9/L (ref 150–450)
RBC # BLD AUTO: 4.14 10E12/L (ref 3.8–5.2)
WBC # BLD AUTO: 8.5 10E9/L (ref 4–11)

## 2020-06-24 PROCEDURE — 85027 COMPLETE CBC AUTOMATED: CPT | Performed by: ADVANCED PRACTICE MIDWIFE

## 2020-06-24 PROCEDURE — 90471 IMMUNIZATION ADMIN: CPT | Performed by: ADVANCED PRACTICE MIDWIFE

## 2020-06-24 PROCEDURE — 99207 ZZC PRENATAL VISIT: CPT | Performed by: ADVANCED PRACTICE MIDWIFE

## 2020-06-24 PROCEDURE — 82950 GLUCOSE TEST: CPT | Performed by: ADVANCED PRACTICE MIDWIFE

## 2020-06-24 PROCEDURE — 86780 TREPONEMA PALLIDUM: CPT | Performed by: ADVANCED PRACTICE MIDWIFE

## 2020-06-24 PROCEDURE — 36415 COLL VENOUS BLD VENIPUNCTURE: CPT | Performed by: ADVANCED PRACTICE MIDWIFE

## 2020-06-24 PROCEDURE — 90715 TDAP VACCINE 7 YRS/> IM: CPT | Performed by: ADVANCED PRACTICE MIDWIFE

## 2020-06-24 NOTE — PROGRESS NOTES
S: Feels well overall. Endorses mild left epigastric pain at night when laying on left side. Has experienced occasional heartburn and mild headaches, but has not used medications for relief. Pt states rest/sleep alleviates headaches. Notes darker stools since being on iron supplement. Denies constipation. Baby active.  Denies uterine cramping, vaginal bleeding, or leaking of fluid.    O: Vitals: /68   Wt 75.6 kg (166 lb 11.2 oz)   LMP 11/04/2019   BMI 31.50 kg/m    BMI= Body mass index is 31.5 kg/m .  Exam:  Constitutional: Healthy, alert and no distress  Respiratory: Respirations even and unlabored  Gastrointestinal: Abdomen soft, non-tender. Fundus measures appropriate for gestational age. Fetal heart tones heard without difficulty and within normal limits.  : Normal external genitalia without lesions  Psychiatric: Mentation appears normal and affect normal/bright    A:     ICD-10-CM    1. Encounter for supervision of other normal pregnancy in third trimester  Z34.83 CBC with platelets     Treponema Abs w Reflex to RPR and Titer     Glucose tolerance, gest screen, 1 hour       P: Recommended Tums for heartburn relief. Advised to refrain from eating 2-3 hours before bedtime and right-sided sleep positions/elevating head if symptoms worsen.  May use Tylenol for prn pain relief; avoid exceeding 3000 mg in 24 hours.  GCT/repeat CBC/RPR today, handout provided.  Tdap given; reviewed CDC recommendations and partner/family vaccination recommended as well.  Need for Rhogam? No.   Encouraged patient to call with any questions or concerns.  Plan virtual visit in 2 weeks for next routine prenatal visit.    Kerrie Henry, JULIAN, APRN, CNM

## 2020-06-24 NOTE — NURSING NOTE
"Chief Complaint   Patient presents with     Prenatal Care   28w1d  GCT today  C/o upper abd pain maritza when sleeping Lt side    initial /68   Wt 75.6 kg (166 lb 11.2 oz)   LMP 11/04/2019   BMI 31.50 kg/m   Estimated body mass index is 31.5 kg/m  as calculated from the following:    Height as of 5/5/20: 1.549 m (5' 1\").    Weight as of this encounter: 75.6 kg (166 lb 11.2 oz).  BP completed using cuff size regular.  Keshia Mckinnon CMA'  "

## 2020-06-25 LAB
GLUCOSE 1H P 50 G GLC PO SERPL-MCNC: 129 MG/DL (ref 60–129)
T PALLIDUM AB SER QL: NONREACTIVE

## 2020-07-07 ENCOUNTER — VIRTUAL VISIT (OUTPATIENT)
Dept: OBGYN | Facility: CLINIC | Age: 30
End: 2020-07-07
Payer: COMMERCIAL

## 2020-07-07 VITALS — BODY MASS INDEX: 32.05 KG/M2 | WEIGHT: 169.6 LBS

## 2020-07-07 DIAGNOSIS — Z34.83 ENCOUNTER FOR SUPERVISION OF OTHER NORMAL PREGNANCY IN THIRD TRIMESTER: Primary | ICD-10-CM

## 2020-07-07 PROCEDURE — 99207 ZZC PRENATAL VISIT: CPT | Performed by: ADVANCED PRACTICE MIDWIFE

## 2020-07-07 NOTE — PROGRESS NOTES
"Cheryl Fletcher is a 30 year old female who is being evaluated via a billable telephone visit.      The patient has been notified of following:     \"This telephone visit will be conducted via a call between you and your physician/provider. We have found that certain health care needs can be provided without the need for a physical exam.  This service lets us provide the care you need with a short phone conversation.  If a prescription is necessary we can send it directly to your pharmacy.  If lab work is needed we can place an order for that and you can then stop by our lab to have the test done at a later time.    Telephone visits are billed at different rates depending on your insurance coverage. During this emergency period, for some insurers they may be billed the same as an in-person visit.  Please reach out to your insurance provider with any questions.    If during the course of the call the physician/provider feels a telephone visit is not appropriate, you will not be charged for this service.\"    Patient has given verbal consent for Telephone visit?  Yes    What phone number would you like to be contacted at? [phone]    How would you like to obtain your AVS? MyChart      Chief Complaint   Patient presents with     Prenatal Care     30w c/o ankle/legs swelling, worse when walking, denies headaches        Initial Wt 76.9 kg (169 lb 9.6 oz)   LMP 2019   BMI 32.05 kg/m   Estimated body mass index is 32.05 kg/m  as calculated from the following:    Height as of 20: 1.549 m (5' 1\").    Weight as of this encounter: 76.9 kg (169 lb 9.6 oz).  BP completed using cuff size: NA (Not Taken)    Questioned patient about current smoking habits.  Pt. has never smoked.          The following HM Due: NONE    Nikole Bell CMA      Provider's notes: Attempted to call patient x4. Left message on third attempt informing patient that CNM would try back once more this evening. If unable to connect, requested " that patient reschedule telephone visit this week.    Kerrie Henry, DNP, APRN, CNM

## 2020-08-17 ENCOUNTER — MYC MEDICAL ADVICE (OUTPATIENT)
Dept: OBGYN | Facility: CLINIC | Age: 30
End: 2020-08-17

## 2020-09-01 ENCOUNTER — PRENATAL OFFICE VISIT (OUTPATIENT)
Dept: OBGYN | Facility: CLINIC | Age: 30
End: 2020-09-01
Payer: COMMERCIAL

## 2020-09-01 VITALS — SYSTOLIC BLOOD PRESSURE: 124 MMHG | BODY MASS INDEX: 33.25 KG/M2 | WEIGHT: 176 LBS | DIASTOLIC BLOOD PRESSURE: 72 MMHG

## 2020-09-01 DIAGNOSIS — Z34.83 ENCOUNTER FOR SUPERVISION OF OTHER NORMAL PREGNANCY IN THIRD TRIMESTER: Primary | ICD-10-CM

## 2020-09-01 PROCEDURE — 99207 ZZC PRENATAL VISIT: CPT | Performed by: ADVANCED PRACTICE MIDWIFE

## 2020-09-01 PROCEDURE — 87653 STREP B DNA AMP PROBE: CPT | Performed by: ADVANCED PRACTICE MIDWIFE

## 2020-09-01 NOTE — PATIENT INSTRUCTIONS
"Labor Instructions for Midwife Patients    When to call:  Both during and after office hours call [phone]. There is a Nurse Midwife available to take your calls and answer your questions 24 hours a day.     When to call:  Call anytime you have important concerns about you or your baby.     Call if:    You are having contractions at regular intervals about 5-6 minutes apart lasting 30-60 seconds and becoming increasingly more intense     You have an uncontrollable gush of fluid from your vagina or feel a pop and gush like your water has broken    You have HEAVY bleeding, like heavy period, blood running down your legs, or  soaking a pad.     Some bleeding after a pelvic exam, after intercourse, or in labor when your cervix is dilating is normal and is referred to as \"bloody show\"    You have severe, continuous back or abdominal pain    You feel it is time to go to the hospital    If this is your first labor, call when contractions are very intense and have been about every 3-4 minutes for about an hour    If it is your second labor or more, call when contractions are strong and about every 3-5 minutes or sooner depending on your level of discomfort.     Keep in mind we are always here for you! If you have questions, concerns please don't hesitate to call us.     What to eat/drink in labor: Drink plenty of fluid (water most importantly, juice, soda or tea without caffeine). Eat rice, pasta, soup, cereal, bread/toast, and fruit. Avoid dairy and greasy food as they are difficult to digest and you may experience some nausea during labor.    Comfort measures:    Baths and showers (ok even with ruptured membranes, it may temporarily slow contractions if you are still in the early stage of labor)    Warm/hot packs for back pain or discomfort    Back, belly, or thigh massages    Standing, rocking, walking, leaning over bed or tables, side-lying and sleeping    Miscellaneous:     Contractions are timed from the beginning " of one to the beginning of the next    Try hard to sleep during the early stage of labor when you are not that uncomfortable. Timing of contractions at this point is not important    Even if you cannot sleep, resting in bed or on the couch can help you maintain your energy for labor    When you arrive at the hospital the nurse will check your baby's heartbeat, check your cervix, and will call us. The midwife on call will come in and be with you when you are in active labor    After hours you need to enter the hospital through the emergency room

## 2020-09-01 NOTE — PROGRESS NOTES
S: Feels well and has no concerns.  Baby active.  Denies uterine cramping, vaginal bleeding or leaking of fluid. No headache, increase in edema, no epigastric pain.   O: Vitals: /72 (BP Location: Right arm, Cuff Size: Adult Regular)   Wt 79.8 kg (176 lb)   LMP 11/04/2019   BMI 33.25 kg/m    BMI= Body mass index is 33.25 kg/m .  Exam:  Constitutional: healthy, alert and no distress  Respiratory: respirations even and unlabored  Gastrointestinal: Abdomen soft, non-tender. Fundus measures appropriate for gestational age. Fetal heart tones hear without difficulty and within normal limits  : Normal external genitalia without lesions, cervix 1cm/25%/-3, membranes intact  Psychiatric: mentation appears normal and affect normal/bright  A:     ICD-10-CM    1. Encounter for supervision of other normal pregnancy in third trimester  Z34.83 Strep, Group B by PCR     P: Labor signs and symptoms discussed, aware of numbers to call  Discussed warning signs of PIH/preeclampsia and patient will monitor.  GBS screen completed. Discussed plans for labor.   Encouraged patient to call with any questions or concerns.  Return to clinic 1 weeks    VINCENT Tolliver, SUKUMAR

## 2020-09-01 NOTE — NURSING NOTE
"Chief Complaint   Patient presents with     Prenatal Care     38w, c/o swelling in legs and feet, skin darkening in arrmpits, pain/crack in pelvic area when layng down-goes away with walking       Initial /72 (BP Location: Right arm, Cuff Size: Adult Regular)   Wt 79.8 kg (176 lb)   LMP 2019   BMI 33.25 kg/m   Estimated body mass index is 33.25 kg/m  as calculated from the following:    Height as of 20: 1.549 m (5' 1\").    Weight as of this encounter: 79.8 kg (176 lb).  BP completed using cuff size: regular    Questioned patient about current smoking habits.  Pt. has never smoked.          The following HM Due: NONE  Nikole Bell CMA    "

## 2020-09-02 LAB
GP B STREP DNA SPEC QL NAA+PROBE: NEGATIVE
SPECIMEN SOURCE: NORMAL

## 2020-09-10 ENCOUNTER — PRENATAL OFFICE VISIT (OUTPATIENT)
Dept: OBGYN | Facility: CLINIC | Age: 30
End: 2020-09-10
Payer: COMMERCIAL

## 2020-09-10 VITALS — DIASTOLIC BLOOD PRESSURE: 72 MMHG | WEIGHT: 179 LBS | SYSTOLIC BLOOD PRESSURE: 118 MMHG | BODY MASS INDEX: 33.82 KG/M2

## 2020-09-10 DIAGNOSIS — Z34.83 ENCOUNTER FOR SUPERVISION OF OTHER NORMAL PREGNANCY IN THIRD TRIMESTER: Primary | ICD-10-CM

## 2020-09-10 DIAGNOSIS — O99.012 ANEMIA AFFECTING PREGNANCY IN SECOND TRIMESTER: ICD-10-CM

## 2020-09-10 PROCEDURE — 99207 ZZC PRENATAL VISIT: CPT | Performed by: ADVANCED PRACTICE MIDWIFE

## 2020-09-10 RX ORDER — FERROUS GLUCONATE 324(38)MG
324 TABLET ORAL
Qty: 60 TABLET | Refills: 1 | Status: SHIPPED | OUTPATIENT
Start: 2020-09-10 | End: 2020-10-01

## 2020-09-10 NOTE — PATIENT INSTRUCTIONS
"  Labor Instructions for Midwife Patients    When to call:  Both during and after office hours call [phone]. There is a Nurse Midwife available to take your calls and answer your questions 24 hours a day.     When to call:  Call anytime you have important concerns about you or your baby.     Call if:    You are having contractions at regular intervals about 5-6 minutes apart lasting 30-60 seconds and becoming increasingly more intense     You have an uncontrollable gush of fluid from your vagina or feel a pop and gush like your water has broken    You have HEAVY bleeding, like heavy period, blood running down your legs, or  soaking a pad.     Some bleeding after a pelvic exam, after intercourse, or in labor when your cervix is dilating is normal and is referred to as \"bloody show\"    You have severe, continuous back or abdominal pain    You feel it is time to go to the hospital    If this is your first labor, call when contractions are very intense and have been about every 3-4 minutes for about an hour    If it is your second labor or more, call when contractions are strong and about every 3-5 minutes or sooner depending on your level of discomfort.     Keep in mind we are always here for you! If you have questions, concerns please don't hesitate to call us.     What to eat/drink in labor: Drink plenty of fluid (water most importantly, juice, soda or tea without caffeine). Eat rice, pasta, soup, cereal, bread/toast, and fruit. Avoid dairy and greasy food as they are difficult to digest and you may experience some nausea during labor.    Comfort measures:    Baths and showers (ok even with ruptured membranes, it may temporarily slow contractions if you are still in the early stage of labor)    Warm/hot packs for back pain or discomfort    Back, belly, or thigh massages    Standing, rocking, walking, leaning over bed or tables, side-lying and sleeping    Miscellaneous:     Contractions are timed from the " beginning of one to the beginning of the next    Try hard to sleep during the early stage of labor when you are not that uncomfortable. Timing of contractions at this point is not important    Even if you cannot sleep, resting in bed or on the couch can help you maintain your energy for labor    When you arrive at the hospital the nurse will check your baby's heartbeat, check your cervix, and will call us. The midwife on call will come in and be with you when you are in active labor    After hours you need to enter the hospital through the emergency room    After hours (8:30PM), please enter hospital through front doors to avoid ER during Covid pandemic. Doors will be locked but call number posted for entry and you will be escorted to L&D.

## 2020-09-10 NOTE — PROGRESS NOTES
S: Feels well,  Baby active.  Denies uterine cramping, vaginal bleeding or leaking of fluid. No headache, increase in edema, no epigastric pain.     O: Vitals: /72   Wt 81.2 kg (179 lb)   LMP 11/04/2019   BMI 33.82 kg/m    BMI= Body mass index is 33.82 kg/m .  Exam:  Constitutional: healthy, alert and no distress  Respiratory: respirations even and unlabored  Gastrointestinal: Abdomen soft, non-tender. Fundus measures appropriate for gestational age. Fetal heart tones hear without difficulty and within normal limits  : Deferred and Normal external genitalia without lesions, 1/25%/-3, scant bloody show, pt declined membrane sweep.   Psychiatric: mentation appears normal and affect normal/bright    A:     ICD-10-CM    1. Encounter for supervision of other normal pregnancy in third trimester  Z34.83    2. Anemia affecting pregnancy in second trimester  O99.012 ferrous gluconate (FERGON) 324 (38 Fe) MG tablet        P: Labor signs and symptoms discussed, aware of numbers to call.   GBS screen neg  Discussed plans for labor. She desires unmedicatated birth and desires to go into labor spontaneously.   Discussed postdates options.  Encouraged patient to call with any questions or concerns.  Iron refill sent  Return to clinic 1 weeks    VINCENT Anthony, SUKUMAR

## 2020-09-10 NOTE — NURSING NOTE
"Chief Complaint   Patient presents with     Prenatal Care     39 2/7 WEEKS       Initial /72   Wt 81.2 kg (179 lb)   LMP 2019   BMI 33.82 kg/m   Estimated body mass index is 33.82 kg/m  as calculated from the following:    Height as of 20: 1.549 m (5' 1\").    Weight as of this encounter: 81.2 kg (179 lb).  BP completed using cuff size: regular    Questioned patient about current smoking habits.  Pt. has never smoked.          The following HM Due: NONE  +fetal movements  +++feet swelling    Aye Lipscomb, CMA    "

## 2020-09-16 ENCOUNTER — TELEPHONE (OUTPATIENT)
Dept: OBGYN | Facility: CLINIC | Age: 30
End: 2020-09-16

## 2020-09-16 ENCOUNTER — HOSPITAL ENCOUNTER (INPATIENT)
Facility: CLINIC | Age: 30
LOS: 1 days | Discharge: HOME | End: 2020-09-17
Attending: ADVANCED PRACTICE MIDWIFE | Admitting: ADVANCED PRACTICE MIDWIFE
Payer: COMMERCIAL

## 2020-09-16 LAB
ABO + RH BLD: NORMAL
ABO + RH BLD: NORMAL
AMPHETAMINES UR QL SCN: NEGATIVE
BASOPHILS # BLD AUTO: 0 10E9/L (ref 0–0.2)
BASOPHILS NFR BLD AUTO: 0.4 %
BLD GP AB SCN SERPL QL: NORMAL
BLOOD BANK CMNT PATIENT-IMP: NORMAL
CANNABINOIDS UR QL: NEGATIVE
COCAINE UR QL: NEGATIVE
DIFFERENTIAL METHOD BLD: ABNORMAL
EOSINOPHIL # BLD AUTO: 0 10E9/L (ref 0–0.7)
EOSINOPHIL NFR BLD AUTO: 0.1 %
ERYTHROCYTE [DISTWIDTH] IN BLOOD BY AUTOMATED COUNT: 14.7 % (ref 10–15)
HCT VFR BLD AUTO: 40 % (ref 35–47)
HGB BLD-MCNC: 12.8 G/DL (ref 11.7–15.7)
IMM GRANULOCYTES # BLD: 0 10E9/L (ref 0–0.4)
IMM GRANULOCYTES NFR BLD: 0.4 %
LABORATORY COMMENT REPORT: NORMAL
LYMPHOCYTES # BLD AUTO: 1.7 10E9/L (ref 0.8–5.3)
LYMPHOCYTES NFR BLD AUTO: 19.7 %
MCH RBC QN AUTO: 25.9 PG (ref 26.5–33)
MCHC RBC AUTO-ENTMCNC: 32 G/DL (ref 31.5–36.5)
MCV RBC AUTO: 81 FL (ref 78–100)
MONOCYTES # BLD AUTO: 0.6 10E9/L (ref 0–1.3)
MONOCYTES NFR BLD AUTO: 7.5 %
NEUTROPHILS # BLD AUTO: 6.1 10E9/L (ref 1.6–8.3)
NEUTROPHILS NFR BLD AUTO: 71.9 %
NRBC # BLD AUTO: 0 10*3/UL
NRBC BLD AUTO-RTO: 0 /100
OPIATES UR QL SCN: NEGATIVE
PCP UR QL SCN: NEGATIVE
PLATELET # BLD AUTO: 188 10E9/L (ref 150–450)
RBC # BLD AUTO: 4.95 10E12/L (ref 3.8–5.2)
SARS-COV-2 RNA SPEC QL NAA+PROBE: NEGATIVE
SARS-COV-2 RNA SPEC QL NAA+PROBE: NORMAL
SPECIMEN EXP DATE BLD: NORMAL
SPECIMEN SOURCE: NORMAL
SPECIMEN SOURCE: NORMAL
WBC # BLD AUTO: 8.5 10E9/L (ref 4–11)

## 2020-09-16 PROCEDURE — 86901 BLOOD TYPING SEROLOGIC RH(D): CPT | Performed by: ADVANCED PRACTICE MIDWIFE

## 2020-09-16 PROCEDURE — 85025 COMPLETE CBC W/AUTO DIFF WBC: CPT | Performed by: ADVANCED PRACTICE MIDWIFE

## 2020-09-16 PROCEDURE — 12000000 ZZH R&B MED SURG/OB

## 2020-09-16 PROCEDURE — 36415 COLL VENOUS BLD VENIPUNCTURE: CPT | Performed by: ADVANCED PRACTICE MIDWIFE

## 2020-09-16 PROCEDURE — 25000125 ZZHC RX 250: Performed by: ADVANCED PRACTICE MIDWIFE

## 2020-09-16 PROCEDURE — 72200001 ZZH LABOR CARE VAGINAL DELIVERY SINGLE

## 2020-09-16 PROCEDURE — U0003 INFECTIOUS AGENT DETECTION BY NUCLEIC ACID (DNA OR RNA); SEVERE ACUTE RESPIRATORY SYNDROME CORONAVIRUS 2 (SARS-COV-2) (CORONAVIRUS DISEASE [COVID-19]), AMPLIFIED PROBE TECHNIQUE, MAKING USE OF HIGH THROUGHPUT TECHNOLOGIES AS DESCRIBED BY CMS-2020-01-R: HCPCS | Performed by: ADVANCED PRACTICE MIDWIFE

## 2020-09-16 PROCEDURE — 80307 DRUG TEST PRSMV CHEM ANLYZR: CPT | Performed by: ADVANCED PRACTICE MIDWIFE

## 2020-09-16 PROCEDURE — 86850 RBC ANTIBODY SCREEN: CPT | Performed by: ADVANCED PRACTICE MIDWIFE

## 2020-09-16 PROCEDURE — 86900 BLOOD TYPING SEROLOGIC ABO: CPT | Performed by: ADVANCED PRACTICE MIDWIFE

## 2020-09-16 PROCEDURE — G0463 HOSPITAL OUTPT CLINIC VISIT: HCPCS | Mod: 25

## 2020-09-16 RX ORDER — ACETAMINOPHEN 325 MG/1
650 TABLET ORAL EVERY 4 HOURS PRN
Status: DISCONTINUED | OUTPATIENT
Start: 2020-09-16 | End: 2020-09-17 | Stop reason: HOSPADM

## 2020-09-16 RX ORDER — FENTANYL CITRATE 50 UG/ML
50-100 INJECTION, SOLUTION INTRAMUSCULAR; INTRAVENOUS
Status: DISCONTINUED | OUTPATIENT
Start: 2020-09-16 | End: 2020-09-16

## 2020-09-16 RX ORDER — OXYTOCIN 10 [USP'U]/ML
10 INJECTION, SOLUTION INTRAMUSCULAR; INTRAVENOUS
Status: DISCONTINUED | OUTPATIENT
Start: 2020-09-16 | End: 2020-09-16

## 2020-09-16 RX ORDER — METHYLERGONOVINE MALEATE 0.2 MG/ML
200 INJECTION INTRAVENOUS
Status: DISCONTINUED | OUTPATIENT
Start: 2020-09-16 | End: 2020-09-16

## 2020-09-16 RX ORDER — OXYTOCIN 10 [USP'U]/ML
10 INJECTION, SOLUTION INTRAMUSCULAR; INTRAVENOUS
Status: DISCONTINUED | OUTPATIENT
Start: 2020-09-16 | End: 2020-09-17 | Stop reason: HOSPADM

## 2020-09-16 RX ORDER — CARBOPROST TROMETHAMINE 250 UG/ML
250 INJECTION, SOLUTION INTRAMUSCULAR
Status: DISCONTINUED | OUTPATIENT
Start: 2020-09-16 | End: 2020-09-16

## 2020-09-16 RX ORDER — OXYCODONE AND ACETAMINOPHEN 5; 325 MG/1; MG/1
1 TABLET ORAL
Status: DISCONTINUED | OUTPATIENT
Start: 2020-09-16 | End: 2020-09-16

## 2020-09-16 RX ORDER — IBUPROFEN 800 MG/1
800 TABLET, FILM COATED ORAL EVERY 6 HOURS PRN
Status: DISCONTINUED | OUTPATIENT
Start: 2020-09-16 | End: 2020-09-17 | Stop reason: HOSPADM

## 2020-09-16 RX ORDER — OXYTOCIN/0.9 % SODIUM CHLORIDE 30/500 ML
100 PLASTIC BAG, INJECTION (ML) INTRAVENOUS CONTINUOUS
Status: DISCONTINUED | OUTPATIENT
Start: 2020-09-16 | End: 2020-09-17 | Stop reason: HOSPADM

## 2020-09-16 RX ORDER — IBUPROFEN 800 MG/1
800 TABLET, FILM COATED ORAL
Status: DISCONTINUED | OUTPATIENT
Start: 2020-09-16 | End: 2020-09-16

## 2020-09-16 RX ORDER — NALOXONE HYDROCHLORIDE 0.4 MG/ML
.1-.4 INJECTION, SOLUTION INTRAMUSCULAR; INTRAVENOUS; SUBCUTANEOUS
Status: DISCONTINUED | OUTPATIENT
Start: 2020-09-16 | End: 2020-09-16

## 2020-09-16 RX ORDER — TRANEXAMIC ACID 10 MG/ML
1 INJECTION, SOLUTION INTRAVENOUS EVERY 30 MIN PRN
Status: DISCONTINUED | OUTPATIENT
Start: 2020-09-16 | End: 2020-09-16

## 2020-09-16 RX ORDER — BISACODYL 10 MG
10 SUPPOSITORY, RECTAL RECTAL DAILY PRN
Status: DISCONTINUED | OUTPATIENT
Start: 2020-09-18 | End: 2020-09-17 | Stop reason: HOSPADM

## 2020-09-16 RX ORDER — TRANEXAMIC ACID 10 MG/ML
1 INJECTION, SOLUTION INTRAVENOUS EVERY 30 MIN PRN
Status: DISCONTINUED | OUTPATIENT
Start: 2020-09-16 | End: 2020-09-17 | Stop reason: HOSPADM

## 2020-09-16 RX ORDER — NALOXONE HYDROCHLORIDE 0.4 MG/ML
.1-.4 INJECTION, SOLUTION INTRAMUSCULAR; INTRAVENOUS; SUBCUTANEOUS
Status: DISCONTINUED | OUTPATIENT
Start: 2020-09-16 | End: 2020-09-17 | Stop reason: HOSPADM

## 2020-09-16 RX ORDER — HYDROCORTISONE 2.5 %
CREAM (GRAM) TOPICAL 3 TIMES DAILY PRN
Status: DISCONTINUED | OUTPATIENT
Start: 2020-09-16 | End: 2020-09-17 | Stop reason: HOSPADM

## 2020-09-16 RX ORDER — SODIUM CHLORIDE, SODIUM LACTATE, POTASSIUM CHLORIDE, CALCIUM CHLORIDE 600; 310; 30; 20 MG/100ML; MG/100ML; MG/100ML; MG/100ML
INJECTION, SOLUTION INTRAVENOUS CONTINUOUS
Status: DISCONTINUED | OUTPATIENT
Start: 2020-09-16 | End: 2020-09-16

## 2020-09-16 RX ORDER — DOCUSATE SODIUM 100 MG/1
100 CAPSULE, LIQUID FILLED ORAL 2 TIMES DAILY
Status: DISCONTINUED | OUTPATIENT
Start: 2020-09-16 | End: 2020-09-17 | Stop reason: HOSPADM

## 2020-09-16 RX ORDER — ONDANSETRON 2 MG/ML
4 INJECTION INTRAMUSCULAR; INTRAVENOUS EVERY 6 HOURS PRN
Status: DISCONTINUED | OUTPATIENT
Start: 2020-09-16 | End: 2020-09-16

## 2020-09-16 RX ORDER — MODIFIED LANOLIN
OINTMENT (GRAM) TOPICAL
Status: DISCONTINUED | OUTPATIENT
Start: 2020-09-16 | End: 2020-09-17 | Stop reason: HOSPADM

## 2020-09-16 RX ORDER — OXYTOCIN/0.9 % SODIUM CHLORIDE 30/500 ML
340 PLASTIC BAG, INJECTION (ML) INTRAVENOUS CONTINUOUS PRN
Status: DISCONTINUED | OUTPATIENT
Start: 2020-09-16 | End: 2020-09-17 | Stop reason: HOSPADM

## 2020-09-16 RX ORDER — OXYTOCIN/0.9 % SODIUM CHLORIDE 30/500 ML
100-340 PLASTIC BAG, INJECTION (ML) INTRAVENOUS CONTINUOUS PRN
Status: COMPLETED | OUTPATIENT
Start: 2020-09-16 | End: 2020-09-16

## 2020-09-16 RX ORDER — ACETAMINOPHEN 325 MG/1
650 TABLET ORAL EVERY 4 HOURS PRN
Status: DISCONTINUED | OUTPATIENT
Start: 2020-09-16 | End: 2020-09-16

## 2020-09-16 RX ADMIN — Medication 340 ML/HR: at 14:50

## 2020-09-16 ASSESSMENT — MIFFLIN-ST. JEOR: SCORE: 1469.32

## 2020-09-16 NOTE — PLAN OF CARE
Data: [facility]: 2020 12:30 PM.  Reason for maternal/fetal assessment is uterine contractions. Patient reports that around 0130 this morning she noticed contractions starting, they are getting stronger and about 4 mins apart.  Patient is a .  Prenatal record reviewed. Pregnancy has been uncomplicated..  Gestational Age 40w1d. VSS. Fetal movement present. Patient denies nausea, vomiting, headache, visual disturbances, epigastric or URQ pain, significant edema. Support person is present.   Action: Verbal consent for EFM. Triage assessment completed. Bill of rights reviewed.  Response: Patient verbalized agreement with plan. Will contact Dr Elvie Hernandez with update and further orders.

## 2020-09-16 NOTE — H&P
SUKUMAR Labor Admission History & Physical    Cheryl Fletcher is a 30 year old  with an IUP at 40w1d  ; ,   Partner/support Person: Mio  Language Barrier: English  Clinic: [facility]  Provider: Eliseo    Cheryl Fletcher is admitted to the Birthplace at [facility] on 2020 at 1:02 PM     Moka has been shreyas off and on since 0130 this morning. The contractions are now getting stronger and closer together. She reports them being about 4mins apart.       History of present inllness/Chief Complaint:    Here with: spontaneous onset of labor  Patient reports contractions are Regular           Baby active Yes  Membranes are intact.  Bloody show Yes   Any changes with medical history since last prenatal visit No  Declines syphilis screening.  Neg x2 in prenatal care     Obstetrical history  Estimated Date of Delivery: Sep 15, 2020 determined by LMP  Patient's last menstrual period was 2019.   Dating U/S: 20    Fetal anatomic survey: Abnormal: bilateral choroid plexus cysts further evaluated by MFM, no other soft markers and pt declined genetic testing.   Placenta: right lateral     PRENATAL COURSE  Prenatal care began at 21w3d wks gestation for a total of 5 prenatal visits.  Total wt gain 29lb; There is no height or weight on file to calculate BMI.  Prenatal Blood Pressure: WNL  Prenatal course was essentially uncomplicated  Tdap: done  Rhogam: not indicated     Patient Active Problem List   Diagnosis     Erythrocytosis     Anemia affecting pregnancy in second trimester     Labor and delivery, indication for care       HISTORY  No Known Allergies  Past Medical History:   Diagnosis Date     Asthma     As a child.      Past Surgical History:   Procedure Laterality Date     C INDUCED ABORTN BY DIL/EVAC      elective      LASIK BILATERAL       Family History   Problem Relation Age of Onset     Rectal Cancer Mother      Hypertension Sister      Asthma Father      Family History Negative  No family hx of      Social History     Tobacco Use     Smoking status: Never Smoker     Smokeless tobacco: Never Used   Substance Use Topics     Alcohol use: No     Alcohol/week: 0.0 standard drinks     OB History    Para Term  AB Living   5 3 3 0 1 3   SAB TAB Ectopic Multiple Live Births   0 1 0 0 3      # Outcome Date GA Lbr Jose M/2nd Weight Sex Delivery Anes PTL Lv   5 Current            4 Term 16 39w4d 08:59 / 00:05 3.43 kg (7 lb 9 oz) F Vag-Spont Local N NERI      Apgar1: 9  Apgar5: 9   3 Term 02/12/15 40w2d 03:14 / 00:05 3.997 kg (8 lb 13 oz) F Vag-Spont None N NERI      Apgar1: 7  Apgar5: 9   2 Term 13 39w5d 09:34 / 01:37 3.3 kg (7 lb 4.4 oz) M Vag-Spont EPI  NERI      Name: Binu      Apgar1: 9  Apgar5: 9   1 TAB 2009 17w0d             Birth Comments:        LABS:  Lab Results   Component Value Date    ABO B 2020    RH Pos 2020    AS Neg 2020    HGB 10.5 (L) 2020    HEPBANG Nonreactive 2020    CHPCRT Negative 2020    GCPCRT Negative 2020    TREPAB Negative 2016    RUBELLAABIGG 25 2013    HIV Negative 2013       GBS Status:   Lab Results   Component Value Date    GBS Negative 2020     Rubella: equivocal   HIV: Non-Reactive   Platelets:  221  1hr GCT:  129    ROS   Pt is alert and oriented  Pt denies significant constitutional symptoms including fever and/or malaise.    Pt denies significant respiratory, cardiovacular, GI, or muscular/skeletal complaints.    Neuro: Denies HA and visual changes  Muscoloskeletal: Denies except for discomforts r/t pregnancy     PHYSICAL EXAM:  Temp 98.4  F (36.9  C) (Oral)   Resp 16   LMP 2019   General appearance:  healthy, alert and active   Heart: RRR  Lungs: CTA bilaterally, normal respiratory effort  Abdomen: gravid, single vertex fetus, non-tender, EFW 7.5 lbs.   Legs: reflexes 2+ bilaterally, no clonus, no edema     Contractions: Pt is shreyas every 4 minutes,  lasting 60 seconds and palpates moderate    Fetal heart tones: Baseline 150s   Variability: moderate   Accelerations: present  Decelerations: present - early decels    NST: reactive    Cervix: 3/ 70% / Mid/ soft/ -1, Vtx  Bloody show: no  Membranes:  Ruptured, thin meconium noted     ASSESSMENT:  30 year old  with forrest IUP 40w1d in early labor  NST reactive  GBS negative and membranes ruptured for 30mins  Meconium-stained amniotic fluid    PLAN:  Routine CNM care  Labs ordered: Hemoglobin and type and screen  Teaching done r/t comfort measures, pain management options, and labor processes, and pt desires to avoid epidrural  Admit - see IP orders  Anticipate   Plan to have NICU present at delivery  Consult on call MD as needed     Elvie Hernandez CNM

## 2020-09-16 NOTE — L&D DELIVERY NOTE
OB Vaginal Delivery Note    Cheryl Fletcher MRN# [id number]   Age: 30 year old YOB: 1990       GA: 40w1d  GP:   Labor Complications:  Shoulder dystocia   EBL:   mL  Delivery QBL: 75 mL  Delivery Type: Vaginal, Spontaneous   ROM to Delivery Time: (Delivered) Hours: 2 Minutes: 0   Weight: 4.25 kg (9 lb 5.9 oz)    1 Minute 5 Minute 10 Minute   Apgar Totals: 9   9             Delivery Note    IUP at 40 weeks gestation delivered on 2020.     delivery of a viable  Female infant.  Apgars of 9 at 1 minute and 9 at 5 minutes.  Labor was spontaneous.    Medications administered  in labor:  Pain Rx none; Antibiotics No;  Perineum: 1st degree, delayed cord clamping Yes, cord cut by MOB after it ceased pulsing  Placenta-mechanism: spontaneous, intact,  with a 3 vessel cord. IV oxytocin was given.  Estimated Blood Loss:  75cc's  Complications of pregnancy, labor and delivery: Shoulder Dystocia     Shoulder Dystocia Documentation:    Time of delivery of head:  14:40:24  Total time from delivery of head to delivery of body:  14:40:49  Time MD paged:  NA  Time SHANON paged:  NA  Maternal bladder emptied at:  NA    Maneuvers used:    Vasquez:  Yes  Suprapubic pressure: No  Episiotomy preformed: No  Wood's Corkscrew:  No  Reverse Wood's:  No  Delivery of posterior arm:  No  Turned to hands and knees:  No  Other:      Following turtle sign of fetal head, shoulder dystocia was recognized. RN then lowered maternal head of bed and performed Vasquez maneuver at my request. With gentle traction and maternal pushing efforts, the head was delivered easily.     Names of additional assistance and arrival times:  \na    Condition of patient and infant post delivery:  Stable      Delivery Details:  Cheryl Fletcher, a 30 year old  female delivered a viable infant with apgars of 9  and 9 . Patient was fully dilated and pushing after 3  hours 35  minutes in active labor. Delivery was via vaginal, spontaneous  to a  sterile field under none  anesthesia. Infant delivered in vertex  right  occiput  anterior  position. Anterior and posterior shoulders delivered following 25 second shoulder dystocia resolved with Vasquez manuever. The cord was clamped, cut twice and 3 vessels  were noted after it ceased pulsing. Cord blood was obtained in routine fashion with the following disposition: lab .      Cord complications: none   Placenta delivered at 2020  2:50 PM . Placental disposition was Hospital disposal . Fundal massage performed and fundus found to be firm.     Episiotomy: none    Perineum, vagina, cervix were inspected, and the following lacerations were noted:   Perineal lacerations: 1st              Laceration not bleeding, not repaired     Excellent hemostasis was noted. Needle count correct. Infant and patient in delivery room in good and stable condition.    NICU was paged to be at delivery d/t thick meconium, however did not make it in time for delivery.        Labor Event Times    Labor onset date:  20 Onset time:  11:00 AM   Dilation complete date:  20 Complete time:   2:35 PM   Start pushing date/time:  2020 1435      Labor Length    1st Stage (hrs):  3 (min):  35   2nd Stage (hrs):  0 (min):  5   3rd Stage (hrs):  0 (min):  9      Labor Events     labor?:  No   steroids:  None  Labor Type:  Spontaneous  Predominate monitoring during 1st stage:  continuous electronic fetal monitoring     Antibiotics received during labor?:  No     Rupture date/time: 20 1240   Rupture type:  Spontaneous rupture of membranes occuring during spontaneous labor or augmentation  Fluid color:  Meconium     1:1 continuous labor support provided by?:  RN Labor partogram used?:  no      Delivery/Placenta Date and Time    Delivery Date:  20 Delivery Time:   2:40 PM   Placenta Date/Time:  2020  2:50 PM  Oxytocin given at the time of delivery:  after delivery of placenta     Vaginal Counts      "Initial count performed by 2 team members:   Two Team Members   VASHTI Daniels       Needles Suture [place] [employer]   Initial counts 2  5    Added to count       Final counts 2  5    Placed during labor Accounted for at the end of labor   No NA   No NA   No NA    Final count performed by 2 team members:   Two Team Members   VASHTI Daniels      Final count correct?:  Yes     Apgars    Living status:  Living   1 Minute 5 Minute 10 Minute 15 Minute 20 Minute   Skin color: 1  1       Heart rate: 2  2       Reflex irritability: 2  2       Muscle tone: 2  2       Respiratory effort: 2  2       Total: 9  9       Apgars assigned by:  KEYANA AGUILAR RN     Cord    Vessels:  3 Vessels Complications:  None   Cord Blood Disposition:  Lab Gases Sent?:  No      [place] Resuscitation    Methods:  NCPAP, Oximetry   Care at Delivery:  Called by nursing staff at 38 minutes of age for respiratory distress and desats.  Infant had received CPAP and oxygen.  This had been weaned at NNP arrival.  Infant did not appear to have labored respirations but did have upper airway congestion.  Both nares suctioned with 8Fr suction tube with small amount of clear mucus suctioned from right nares, which appeared to help clear up congestion.  Sats >90, mostly 93-95%.  Infant was put to breast with oximeter and began nursing well with sats consistently >93, without labored respiratory effort.  Continue to monitor with mother.  Please call if desats or work of breathing are noted. Fang Ramos, VINCENT CNP   2020 , 3:39 PM.      Output in Delivery Room:  Stool     [place] Measurements    Weight:  9 lb 5.9 oz Length:  1' 9\"   Head circumference:  35.6 cm       Skin to Skin and Feeding Plan    Skin to skin initiation date/time: 1841    Skin to skin with:  Mother  Skin to skin end date/time:     How do you plan to feed your baby:  Breastfeeding     Labor Events and Shoulder Dystocia    Fetal Tracing Prior to " Delivery:  Category 1  Shoulder dystocia present?:  Pos  Anterior shoulder:  left Time body delivered:  1440   Time head delivered:  1440    Time recognized:  9/16/2020 1440    Gentle attempt at traction, assisted by maternal expulsive forces?:  Yes       First Maneuver:  Vasquez maneuver    Time performed:  9/16/2020 1440       Delivery (Maternal) (Provider to Complete) ([id number])    Episiotomy:  None  Perineal lacerations:  1st Repaired?:  No   Number of repair packets:  0     Blood Loss  Mother: Alek Fletcher #[id number]   Start of Mother's Information    IO Blood Loss  09/16/20 1100 - 09/16/20 1629    Delivery QBL (mL) Hospital Encounter 75 mL    Total  75 mL         End of Mother's Information  Mother: Alek Fletcher #[id number]         Delivery - Provider to Complete ([id number])    Delivering clinician:  Elvie Hernandez CNM CNM Care:  Exclusive CNM care in labor  Attempted Delivery Types (Choose all that apply):  Spontaneous Vaginal Delivery  Delivery Type (Choose the 1 that will go to the Birth History):  Vaginal, Spontaneous          Placenta    Delayed Cord Clamping:  Done  Date/Time:  9/16/2020  2:50 PM  Removal:  Spontaneous  Disposition:  Hospital disposal     Anesthesia    Method:  None          Presentation and Position    Presentation:  Vertex  Position:  Right Occiput Anterior           Elvie Hernandez CNM

## 2020-09-16 NOTE — PROVIDER NOTIFICATION
09/16/20 1420   Provider Notification   Provider Name/Title Elvie PATINO   Method of Notification At Bedside   SVE 5/100/-1. CNM paged to come to bedside in anticipation of delivery.

## 2020-09-16 NOTE — TELEPHONE ENCOUNTER
Pt paged CNM on call with reports of small amount of bloody show with mucous around 1:30 this morning. Since then she says she has been shreyas every 4 minutes, lasting 30 seconds. Able to talk through contractions and move around easily. 40 1/7 weeks gestation today. Reports positive fetal movement initially; has not been feeling baby move around as much in the last hour. Continues to have light spotting, denies leaking of watery fluid. Encouraged her to increase fluid intake and try relaxing in a warm bath to see how uterine activity responds. Also reviewed fetal kick counts and advised she call back within a couple hours if <10 movements, increasing intensity of contractions lasting >/= 60 seconds, or LOF. Patient verbalizes understanding and in agreement with plan.    Kerrie Henry, JULIAN, APRN, CNM     normal (ped)...

## 2020-09-16 NOTE — PROGRESS NOTES
"[facility] EM PROGRESS NOTE    SUBJECTIVE:  Pt feeling pressure, breathing through contractions. Was on labor ball.     OBJECTIVE:  BP (!) 136/97   Temp 97.7  F (36.5  C) (Oral)   Resp 18   Ht 1.549 m (5' 1\")   Wt 81.2 kg (179 lb)   LMP 2019   BMI 33.82 kg/m      Fetal heart tones: Baseline 150s   Variability: moderate  Accelerations: present  Decelerations: present, variable, early    Contractions: Pt is shreyas every 2-3 minutes, lasting 80 seconds and palpates strong    Cervix: 5/ 100% / -1, Vtx per RN  ROM: thick meconium fluid    Pitocin- none  Antibiotics- none  Cervical ripening: N/A    ASSESSMENT:  IUP @ 40w1d active labor   GBS- negative  Thick meconium  Cat 1 FHTs     PLAN:   NICU at delivery  Anticipate     Elvie Hernandez CNM    "

## 2020-09-16 NOTE — PLAN OF CARE
Data: Cheryl Fletcher transferred to [facility] via wheelchair at 1745. Baby transferred via parent's arms.  Action: Receiving unit notified of transfer: Yes. Patient and family notified of room change. Report given to VASHTI Kim. Belongings sent to receiving unit. Accompanied by Registered Nurse. Oriented patient to surroundings. Call light within reach. ID bands double-checked with receiving RN.  Response: Patient tolerated transfer and is stable.    Patients mobililty level scored using the bedside mobility assistance tool (BMAT). Patient is at a mobility level test number: 4. Mobility equipment used: wheelchair. Required assist of 0 staff members. Further use of BMAT scoring not required.

## 2020-09-16 NOTE — TELEPHONE ENCOUNTER
Called patient back this morning to see how the rest of her night went. Alek states she was able to get back to sleep. Woke up to use the bathroom at 0600 and noticed a bit more bloody show. Has been able to rest since. Contractions are irregular now; not feeling pain with all of them. Reports increased fetal movement since we last spoke. Encouraged her to continue to push fluids and rest as she is able in between periods of upright activity. Reviewed when to call back with s/s active labor. She has a prenatal appointment at 3pm today. Encouraged her to bring her hospital bags to clinic just in case, otherwise advised her to call beforehand for evaluation if things progress. Reassured her clinic staff would be notified of her inpatient status. Patient verbalizes understanding and agreeable to plan.    Kerrie Henry, JULIAN, APRN, CNM

## 2020-09-16 NOTE — PLAN OF CARE
Patient informed of need for urine tox due to late prenatal care (16 weeks gestation or later).  Verbal consent obtained and maternal urine sent. Umbilical cord segment will be sent for toxicology.

## 2020-09-17 VITALS
HEART RATE: 85 BPM | SYSTOLIC BLOOD PRESSURE: 123 MMHG | HEIGHT: 61 IN | RESPIRATION RATE: 18 BRPM | TEMPERATURE: 98.4 F | BODY MASS INDEX: 33.79 KG/M2 | WEIGHT: 179 LBS | DIASTOLIC BLOOD PRESSURE: 68 MMHG

## 2020-09-17 PROCEDURE — 59400 OBSTETRICAL CARE: CPT | Performed by: ADVANCED PRACTICE MIDWIFE

## 2020-09-17 PROCEDURE — 90707 MMR VACCINE SC: CPT | Performed by: ADVANCED PRACTICE MIDWIFE

## 2020-09-17 PROCEDURE — 25000128 H RX IP 250 OP 636: Performed by: ADVANCED PRACTICE MIDWIFE

## 2020-09-17 RX ADMIN — MEASLES, MUMPS, AND RUBELLA VIRUS VACCINE LIVE 0.5 ML: 1000; 12500; 1000 INJECTION, POWDER, LYOPHILIZED, FOR SUSPENSION SUBCUTANEOUS at 13:16

## 2020-09-17 NOTE — PLAN OF CARE
VSS, pain managed with rest and relaxation, pt independent with self and  cares, bonding well with pt.

## 2020-09-17 NOTE — PROGRESS NOTES
"Katie Bowser CNM Progress Note: Postpartum Day #1    2020  10:22 AM    SUBJECTIVE:  Patient is stable and is tolerating acitivity well  Baby is rooming in  Complications since 2 hours post delivery: None  Pain is well controlled.  Patient is not taking pain medications.  Breastfeeding status:initiated and well established   Elimination:  She is voiding without difficulty.  She has not had a bowel movement  Denies heavy bleeding and passing large clots.  Feels happy about birth experience.    OBJECTIVE:  /64   Pulse 87   Temp 98.2  F (36.8  C) (Oral)   Resp 18   Ht 1.549 m (5' 1\")   Wt 81.2 kg (179 lb)   LMP 2019   Breastfeeding Unknown   BMI 33.82 kg/m      Constitutional: healthy, alert and no distress    Breasts: Currently breastfeeding    Fundus: Uterine fundus is firm, non-tender and at 1 below the level of the umbilicus    Perineum: Perineum is well approximated, minimal swelling    Lochia: Lochia is appropriate for the duration of time since delivery.     ASSESSMENT:  PPD #1    Doing well.  No excessive bleeding  Pain well-controlled.  Tolerating physical therapy and rehabilitation well.  Hemoglobin   Date Value Ref Range Status   2020 12.8 11.7 - 15.7 g/dL Final   ]      PLAN:  d/c home today.  f/u in the office in 2 weeks  Reviewed breastfeeding  Reviewed postpartum warning signs  Reviewed postpartum blues and postpartum depression warning signs  Anticipated discharge this afternoon if stable      DILLON White participated in the care of this patient under direct supervision of Nereida Sheehan CNM.     I was present with the student who participated in the service and documentation of the services provided. I have verified the history and personally performed the physical exam and medical decision making as documented by the student and edited by meGemma Sheehan CNM 2020 10:24 AM        Lizz Sheehan CNM        "

## 2020-09-17 NOTE — PROVIDER NOTIFICATION
09/17/20 1335   Provider Notification   Provider Name/Title SUKUMAR Padron   Method of Notification Phone   Request Evaluate-Remote   CNM gave TORB to discharge pt to home today.   Megan Haynes RN on 9/17/2020 at 1:37 PM

## 2020-09-17 NOTE — LACTATION NOTE
This note was copied from a baby's chart.   visit. Infant has been nursing well and often per parent report.  No concerns noted. She also nursed her other children for about 4 months exclusively and then both formula and  beyond that time.  She was encouraged to call prn if she experiences any low milk issues with this baby.

## 2020-09-17 NOTE — DISCHARGE INSTRUCTIONS
Postpartum Vaginal Delivery Instructions    Please make an appointment to see your midwife in 2 weeks from delivery.    Lactation: [phone]    Activity       Ask family and friends for help when you need it.    Do not place anything in your vagina for 6 weeks.    You are not restricted on other activities, but take it easy for a few weeks to allow your body to recover from delivery.  You are able to do any activities you feel up to that point.    No driving until you have stopped taking your pain medications (usually two weeks after delivery).     Call your health care provider if you have any of these symptoms:       Increased pain, swelling, redness, or fluid around your stiches from an episiotomy or perineal tear.    A fever above 100.4 F (38 C) with or without chills when placing a thermometer under your tongue.    You soak a sanitary pad with blood within 1 hour, or you see blood clots larger than a golf ball.    Bleeding that lasts more than 6 weeks.    Vaginal discharge that smells bad.    Severe pain, cramping or tenderness in your lower belly area.    A need to urinate more frequently (use the toilet more often), more urgently (use the toilet very quickly), or it burns when you urinate.    Nausea and vomiting.    Redness, swelling or pain around a vein in your leg.    Problems breastfeeding or a red or painful area on your breast.    Chest pain and cough or are gasping for air.    Problems coping with sadness, anxiety, or depression.  If you have any concerns about hurting yourself or the baby, call your provider immediately.     You have questions or concerns after you return home.     Keep your hands clean:  Always wash your hands before touching your perineal area and stitches.  This helps reduce your risk of infection.  If your hands aren't dirty, you may use an alcohol hand-rub to clean your hands. Keep your nails clean and short.

## 2020-09-17 NOTE — PLAN OF CARE
Oriented to PP unit.  Denies any pain. IV saline locked .  Instructed to fill out birth certificate and depression score . Call light with in reach.

## 2020-09-17 NOTE — DISCHARGE SUMMARY
[facility]    Discharge Summary  Obstetrics    Date of Admission:  2020  Date of Discharge:  2020  Discharging Provider: Lizz Sheehan  Date of Service (when I saw the patient): 20    Discharge Diagnoses       History of Present Illness   Cheryl Fletcher is a 30 year old female who presented with contractions, labor progressed spontaneously,  with 20 second shoulder dystocia to a healthy female infant. Postpartum course unremarkable.     Hospital Course   The patient's hospital course was unremarkable.  She recovered as anticipated and experienced no post-delivery complications. On discharge, her pain was well controlled. Vaginal bleeding is similar to peak menstrual flow.  Voiding without difficulty.  Ambulating well and tolerating a normal diet.  No fevers.  Breastfeeding going well.  Infant is stable.  She will be discharged on post-partum day #1.     Post-partum hemoglobin:   Hemoglobin   Date Value Ref Range Status   2020 12.8 11.7 - 15.7 g/dL Final       Lizz Sheehan CNM    Discharge Disposition   Discharge to home   Condition at discharge: Stable    Primary Care Physician   Lizz Sheehan CNM    Consultations This Hospital Stay   HOME CARE POST PARTUM/ IP CONSULT  LACTATION IP CONSULT    Discharge Orders   No discharge procedures on file.  Discharge Medications   Current Discharge Medication List      CONTINUE these medications which have NOT CHANGED    Details   ferrous gluconate (FERGON) 324 (38 Fe) MG tablet Take 1 tablet (324 mg) by mouth daily (with breakfast)  Qty: 60 tablet, Refills: 1    Associated Diagnoses: Anemia affecting pregnancy in second trimester      Prenatal Vit-Fe Fumarate-FA (PRENATAL MULTIVITAMIN W/IRON) 27-0.8 MG tablet Take 1 tablet by mouth daily  Qty: 90 tablet, Refills: 3    Associated Diagnoses: Encounter for supervision of other normal pregnancy in second trimester           Allergies   No Known Allergies       Xiao  DILLON Goldman participated in the care of this patient under direct supervision of Nereida Sheehan CNM.     I was present with the student who participated in the service and documentation of the services provided. I have verified the history and personally performed the physical exam and medical decision making as documented by the student and edited by me.  CRISTHIAN Sheehan CNM 9/17/2020 10:29 AM

## 2020-09-17 NOTE — PLAN OF CARE
Patient is vitally stable. Ambulating and independent with cares. Pain is well managed. Breastfeeding well with some assistance requested from staff. Reminders given overnight to feed every 2-3 hours. Parents are attentive to infant needs and bonding well with infant.

## 2020-10-01 ENCOUNTER — PRENATAL OFFICE VISIT (OUTPATIENT)
Dept: OBGYN | Facility: CLINIC | Age: 30
End: 2020-10-01
Payer: COMMERCIAL

## 2020-10-01 VITALS — WEIGHT: 152 LBS | DIASTOLIC BLOOD PRESSURE: 66 MMHG | BODY MASS INDEX: 28.72 KG/M2 | SYSTOLIC BLOOD PRESSURE: 104 MMHG

## 2020-10-01 PROCEDURE — 99207 PR NO CHARGE LOS: CPT | Performed by: ADVANCED PRACTICE MIDWIFE

## 2020-10-01 NOTE — NURSING NOTE
"Chief Complaint   Patient presents with     Postpartum Care     2 week PP       Initial /66 (BP Location: Left arm, Cuff Size: Adult Regular)   Wt 68.9 kg (152 lb)   LMP 2019   Breastfeeding Yes   BMI 28.72 kg/m   Estimated body mass index is 28.72 kg/m  as calculated from the following:    Height as of 20: 1.549 m (5' 1\").    Weight as of this encounter: 68.9 kg (152 lb).  BP completed using cuff size: regular    Questioned patient about current smoking habits.  Pt. has never smoked.          Lam Paulino MA        "

## 2020-10-01 NOTE — PROGRESS NOTES
Midwife Postpartum 2 Week Visit    Cheryl Fletcher is a 30 year old here for a 2 week postpartum checkup.     Delivery date was 2020. She had a  of a viable girl, weight 9 pounds 5.9 oz., with no complications.     Since delivery, she has been breast feeding without issue. Pumping as needed to maintain supply/promote emptying.  She has not had any signs of infection. Her lochia is now light to spotting. She is voiding and having bowel movements without difficulty.    She has not had other complications.       Contraception was discussed and patient is unsure.   She has not had intercourse since delivery.   She complains of no perineal discomfort.     Mood is Stable  EPDS=0    Last PHQ-9 score on record =   PHQ-9 SCORE 2019   PHQ-9 Total Score -   PHQ-9 Total Score 0     Last GAD7 score on record =   YASH-7 SCORE 2019   Total Score 0     Alcohol Score = 0    ROS:  CONSTITUTIONAL: NEGATIVE for fever, chills, change in weight  INTEGUMENTARU/SKIN: NEGATIVE for worrisome rashes, moles or lesions  EYES: NEGATIVE for vision changes or irritation  ENT: NEGATIVE for ear, mouth and throat problems  RESP: NEGATIVE for significant cough or SOB  BREAST: NEGATIVE for masses, tenderness or discharge  CV: NEGATIVE for chest pain, palpitations or peripheral edema  GI: NEGATIVE for nausea, abdominal pain, heartburn, or change in bowel habits  : NEGATIVE for unusual urinary or vaginal symptoms.  MUSCULOSKELETAL: NEGATIVE for significant arthralgias or myalgia  NEURO: NEGATIVE for weakness, dizziness or paresthesias  PSYCHIATRIC: NEGATIVE for changes in mood or affect      Current Outpatient Medications:      Prenatal Vit-Fe Fumarate-FA (PRENATAL MULTIVITAMIN W/IRON) 27-0.8 MG tablet, Take 1 tablet by mouth daily, Disp: 90 tablet, Rfl: 3.     OB History    Para Term  AB Living   5 4 4 0 1 4   SAB TAB Ectopic Multiple Live Births   0 1 0 0 4      # Outcome Date GA Lbr Jose M/2nd Weight Sex Delivery Anes PTL Lv    5 Term 20 40w1d 03:35 / 00:05 4.25 kg (9 lb 5.9 oz) F Vag-Spont None N NERI      Name: BART,FEMALE-HEATHER      Apgar1: 9  Apgar5: 9   4 Term 16 39w4d 08:59 / 00:05 3.43 kg (7 lb 9 oz) F Vag-Spont Local N NERI      Apgar1: 9  Apgar5: 9   3 Term 02/12/15 40w2d 03:14 / 00:05 3.997 kg (8 lb 13 oz) F Vag-Spont None N NERI      Apgar1: 7  Apgar5: 9   2 Term 13 39w5d 09:34 / 01:37 3.3 kg (7 lb 4.4 oz) M Vag-Spont EPI  NERI      Name: Binu      Apgar1: 9  Apgar5: 9   1 TAB 2009 17w0d             Birth Comments:      Last pap:    Lab Results   Component Value Date    PAP NIL 2019     Hgb in hospital was 12.8    EXAM:  /66 (BP Location: Left arm, Cuff Size: Adult Regular)   Wt 68.9 kg (152 lb)   LMP 2019   Breastfeeding Yes   BMI 28.72 kg/m    BMI: Body mass index is 28.72 kg/m .  Exam:  Constitutional: healthy, alert and no distress  Respiratory: good diaphragmatic excursion; breathing even, unlabored  Psychiatric: mentation appears normal and affect normal/bright    ASSESSMENT:   Normal postpartum exam after .    ICD-10-CM    1. Routine postpartum follow-up  Z39.2          PLAN:  No results found for any visits on 10/01/20.    Return for 6 week postpartum visit.  Teaching: exercise, birth control and mental health  Family Planning: unsure; reviewed progesterone-only options as patient is breastfeeding. Plan to discuss further at 6 week postpartum visit.  Encouraged Kegels and gentle abdominal exercises.  Continue a multivitamin/prenatal supplement for one year postpartum, especially while breastfeeding.    Kerrie Henry, DNP, APRN, CNM

## 2020-10-27 ENCOUNTER — PRENATAL OFFICE VISIT (OUTPATIENT)
Dept: OBGYN | Facility: CLINIC | Age: 30
End: 2020-10-27
Payer: COMMERCIAL

## 2020-10-27 VITALS — SYSTOLIC BLOOD PRESSURE: 110 MMHG | DIASTOLIC BLOOD PRESSURE: 64 MMHG | WEIGHT: 152 LBS | BODY MASS INDEX: 28.72 KG/M2

## 2020-10-27 PROCEDURE — 99207 PR POST PARTUM EXAM: CPT | Performed by: ADVANCED PRACTICE MIDWIFE

## 2020-10-27 NOTE — PROGRESS NOTES
Midwife Postpartum 6 Week Visit    Cheryl Fletcher is a 30 year old here for a postpartum checkup.     Delivery date was 20. She had a  of a viable girl, weight 9 pounds 5.9 oz., with Shoulder Dystocia complications      Since delivery, she has been breast feeding without issue. She has not had any signs of infection, her lochia stopped after 4 weeks.     She is voiding and having bowel movements without difficulty; however, does endorse light bleeding with BMs noted on toilet paper. BMs are regular, usually once a day. Reports firm-to-medium stools 2x per day yesterday and today. Denies itching or pain at rectum. Did not develop hemorrhoids during pregnancy that she was aware of.     Contraception was discussed and patient desires condoms.   She  has not had intercourse since delivery.   She complains of no perineal discomfort.     Mood is Stable  Patient screened for postpartum depression.   EPDS = 0    Patient somewhat flat affect in office. States transition continuing to go well and feels well-supported. Feels that screening questions/answers accurately reflected her current state.    Last PHQ-9 score on record =   PHQ-9 SCORE 2019   PHQ-9 Total Score -   PHQ-9 Total Score 0     Last GAD7 score on record =   YASH-7 SCORE 2019   Total Score 0       ROS:  10 point ROS neg other than the symptoms noted above in the HPI.      Current Outpatient Medications:      Prenatal Vit-Fe Fumarate-FA (PRENATAL MULTIVITAMIN W/IRON) 27-0.8 MG tablet, Take 1 tablet by mouth daily, Disp: 90 tablet, Rfl: 3.   OB History    Para Term  AB Living   5 4 4 0 1 4   SAB TAB Ectopic Multiple Live Births   0 1 0 0 4      # Outcome Date GA Lbr Jose M/2nd Weight Sex Delivery Anes PTL Lv   5 Term 20 40w1d 03:35 / 00:05 4.25 kg (9 lb 5.9 oz) F Vag-Spont None N NERI      Name: BART,FEMALE-CHERYL      Apgar1: 9  Apgar5: 9   4 Term 16 39w4d 08:59 / 00:05 3.43 kg (7 lb 9 oz) F Vag-Spont Local N NERI      Apgar1: 9   "Apgar5: 9   3 Term 02/12/15 40w2d 03:14 / 00:05 3.997 kg (8 lb 13 oz) F Vag-Spont None N NERI      Apgar1: 7  Apgar5: 9   2 Term 13 39w5d 09:34 / 01:37 3.3 kg (7 lb 4.4 oz) M Vag-Spont EPI  NERI      Name: Binu      Apgar1: 9  Apgar5: 9   1 TAB  17w0d             Birth Comments:      Last pap:    Lab Results   Component Value Date    PAP NIL 2019     Hgb in hospital was 12.8       EXAM:  /64 (BP Location: Right arm, Cuff Size: Adult Regular)   Wt 68.9 kg (152 lb)   LMP 2019   Breastfeeding Yes   BMI 28.72 kg/m    BMI: Body mass index is 28.72 kg/m .  Constitutional: healthy, alert and no distress  Neck: symmetrical, thyroid normal size, no masses present, no lymphadenopathy present.   Breast: no palpable axillary masses or adenopathy; complete breast exam deferred, as patient is lactating.  Abdomen: soft, non-tender, without diastasis  PELVIC EXAM:  Vulva: No lesions, well healed, BUS WNL, no tenderness  Vagina: Moist, pink, discharge normal, well rugated, no lesions  Cervix: Smooth, no CMT  Uterus: Involuted to normal size, non-tender, no masses palpated  Ovaries: No masses palpated  Pelvic tone: Normal  Rectal exam: Small protrusion of tissue at anterior sphincter; pt states this has \"always been there\" since a repair with once of her previous deliveries. Slightly reddened/raw appearing at inferior aspect of protrusion closest to rectal opening. No significant bulging or varicosities noted with patient bearing down.      ASSESSMENT:   Normal postpartum exam after .    ICD-10-CM    1. Routine postpartum follow-up  Z39.2        PLAN:  No results found for any visits on 10/27/20.    Return as needed or at time of next expected pap, pelvic, or breast exam.    Encouraged TUCKS pads to rectal area as needed; advised to clean with cotton ball soaked with witch hazel tonic. Encouraged increased dietary fiber/Metamucil/stool softeners prn to prevent straining. Avoid prolonged " periods on toilet. Advised to seek emergency care evaluation if symptoms of acute internal rectal bleeding.  Teaching: exercise, birth control and mental health  Family Planning: condoms  Encourage Kegels and abdominal exercise.  Continue a multivitamin/prenatal supplement for one year postpartum, especially while breastfeeding.  Pap smear was not obtained today. DUE 2022 with HPV cotesting.  Postpartum Hgb was not done today.  GDM:  Fasting and 2hr GCT needed:  Carol Henry, JULIAN, APRN, CNM

## 2020-12-20 ENCOUNTER — HEALTH MAINTENANCE LETTER (OUTPATIENT)
Age: 30
End: 2020-12-20

## 2021-10-03 ENCOUNTER — HEALTH MAINTENANCE LETTER (OUTPATIENT)
Age: 31
End: 2021-10-03

## 2021-11-28 ENCOUNTER — HEALTH MAINTENANCE LETTER (OUTPATIENT)
Age: 31
End: 2021-11-28

## 2022-09-04 ENCOUNTER — HEALTH MAINTENANCE LETTER (OUTPATIENT)
Age: 32
End: 2022-09-04

## 2023-01-21 ENCOUNTER — HEALTH MAINTENANCE LETTER (OUTPATIENT)
Age: 33
End: 2023-01-21

## 2024-02-18 ENCOUNTER — HEALTH MAINTENANCE LETTER (OUTPATIENT)
Age: 34
End: 2024-02-18